# Patient Record
Sex: MALE | Race: WHITE | NOT HISPANIC OR LATINO | Employment: OTHER | URBAN - NONMETROPOLITAN AREA
[De-identification: names, ages, dates, MRNs, and addresses within clinical notes are randomized per-mention and may not be internally consistent; named-entity substitution may affect disease eponyms.]

---

## 2019-08-06 ENCOUNTER — OFFICE VISIT (OUTPATIENT)
Dept: CARDIOLOGY | Facility: CLINIC | Age: 54
End: 2019-08-06

## 2019-08-06 VITALS
HEIGHT: 77 IN | BODY MASS INDEX: 37.19 KG/M2 | HEART RATE: 63 BPM | WEIGHT: 315 LBS | SYSTOLIC BLOOD PRESSURE: 110 MMHG | OXYGEN SATURATION: 96 % | DIASTOLIC BLOOD PRESSURE: 68 MMHG

## 2019-08-06 DIAGNOSIS — I49.3 PVC (PREMATURE VENTRICULAR CONTRACTION): ICD-10-CM

## 2019-08-06 DIAGNOSIS — R06.09 DOE (DYSPNEA ON EXERTION): Primary | ICD-10-CM

## 2019-08-06 DIAGNOSIS — I10 ESSENTIAL HYPERTENSION: ICD-10-CM

## 2019-08-06 DIAGNOSIS — M25.551 PAIN OF RIGHT HIP JOINT: ICD-10-CM

## 2019-08-06 DIAGNOSIS — R00.2 PALPITATIONS: ICD-10-CM

## 2019-08-06 PROCEDURE — 99204 OFFICE O/P NEW MOD 45 MIN: CPT | Performed by: INTERNAL MEDICINE

## 2019-08-06 PROCEDURE — 93000 ELECTROCARDIOGRAM COMPLETE: CPT | Performed by: INTERNAL MEDICINE

## 2019-08-06 RX ORDER — HYDROCHLOROTHIAZIDE 25 MG/1
25 TABLET ORAL DAILY
COMMUNITY
End: 2021-11-23 | Stop reason: ALTCHOICE

## 2019-08-06 RX ORDER — TESTOSTERONE CYPIONATE 200 MG/ML
INJECTION, SOLUTION INTRAMUSCULAR
Refills: 2 | COMMUNITY
Start: 2019-07-15

## 2019-08-06 RX ORDER — TRANDOLAPRIL AND VERAPAMIL HYDROCHLORIDE 4; 240 MG/1; MG/1
1 TABLET, FILM COATED, EXTENDED RELEASE ORAL DAILY
Refills: 1 | COMMUNITY
Start: 2019-07-15 | End: 2021-07-12

## 2019-08-06 NOTE — PROGRESS NOTES
Vignesh Alvarez  [id number]  1965  54 y.o.  male    Referring Provider: Sergio Delarosa MD    Reason for  Visit:  Initial visit for  shortness of breath and palpitations  Chest pressure    Subjective    Moderate exertional shortness of breath on exertion relieved with rest  No significant cough or wheezing  Going on for 6  months    Occasional palpitations, once every several weeks lasting for less than 1 minute  No associated symptoms of dizziness, weakness, chest pain,  shortness of breath    Often at night     Associated chest pressure   No nausea   No radiation    Sweats easily   No significant pedal edema    No fever or chills  No significant expectoration    No hemoptysis  No presyncope or syncope     Has moderate snoring with daytime fatigue        History of present illness:  Vignesh Alvarez is a 54 y.o. yo male with history of Essential Hypertension   who presents today for   Chief Complaint   Patient presents with   • Hypertension     NEW PT   • DYSPNEA ON EXERTION   • Palpitations   .    History  Past Medical History:   Diagnosis Date   • Fragoso's esophagus    • Dyspnea on exertion    • Hypertension    ,   Past Surgical History:   Procedure Laterality Date   • REPLACEMENT TOTAL KNEE      RIGHT - 12/2018   ,   Family History   Problem Relation Age of Onset   • Hypertension Mother    • Heart failure Father    • Hypertension Father    ,   Social History     Tobacco Use   • Smoking status: Never Smoker   • Smokeless tobacco: Never Used   Substance Use Topics   • Alcohol use: Yes     Comment: OCC   • Drug use: No   ,     Medications  Current Outpatient Medications   Medication Sig Dispense Refill   • aspirin 81 MG tablet Take 81 mg by mouth Daily.     • hydrochlorothiazide (HYDRODIURIL) 25 MG tablet Take 25 mg by mouth Daily.     • linaclotide (LINZESS) 72 MCG capsule capsule Take 72 mcg by mouth Daily.     • metFORMIN (GLUCOPHAGE) 1000 MG tablet Take 1,000 mg by mouth 2 (Two) Times a Day.     •  "OMEPRAZOLE PO Take 20 mg by mouth Daily.     • Testosterone Cypionate (DEPOTESTOTERONE CYPIONATE) 200 MG/ML injection INJECT 1.5MLS EVERY TWO WEEKS  2   • trandolapril-verapamil (TARKA) 4-240 MG per CR tablet Take 1 tablet by mouth Daily.  1     No current facility-administered medications for this visit.        Allergies:  Patient has no known allergies.    Review of Systems  Review of Systems   Constitution: Positive for weakness and malaise/fatigue.   HENT: Negative.    Eyes: Negative.    Cardiovascular: Positive for dyspnea on exertion and palpitations. Negative for chest pain, claudication, cyanosis, irregular heartbeat, leg swelling, near-syncope, orthopnea, paroxysmal nocturnal dyspnea and syncope.   Respiratory: Negative.    Endocrine: Negative.    Hematologic/Lymphatic: Negative.    Skin: Negative.    Musculoskeletal: Positive for arthritis.   Gastrointestinal: Negative for anorexia.   Genitourinary: Negative.    Psychiatric/Behavioral: Negative.        Objective     Physical Exam:  /68   Pulse 63   Ht 195.6 cm (77\")   Wt (!) 151 kg (333 lb)   SpO2 96%   BMI 39.49 kg/m²     Physical Exam   Constitutional: He appears well-developed.   HENT:   Head: Normocephalic.   Neck: Normal carotid pulses and no JVD present. No tracheal tenderness present. Carotid bruit is not present. No tracheal deviation and no edema present.   Cardiovascular: Regular rhythm, normal heart sounds and normal pulses.   Pulmonary/Chest: Effort normal. No stridor.   Abdominal: Soft.   Neurological: He is alert. He has normal strength. No cranial nerve deficit or sensory deficit.   Skin: Skin is warm.   Psychiatric: He has a normal mood and affect. His speech is normal and behavior is normal.       Results Review:       ECG 12 Lead  Date/Time: 8/6/2019 10:34 AM  Performed by: Abdirahman Quinonez MD  Authorized by: Abdirahman Quinonez MD   Comparison: not compared with previous ECG   Rhythm: sinus rhythm  Ectopy: unifocal PVCs  Rate: " normal  Conduction: conduction normal  T inversion: II, III and aVF  QRS axis: normal    Clinical impression: abnormal EKG            Assessment/Plan   Vignesh was seen today for hypertension, dyspnea on exertion and palpitations.    Diagnoses and all orders for this visit:    MOON (dyspnea on exertion)  -     ECG 12 Lead  -     Adult Stress Echo W/ Cont or Stress Agent if Necessary Per Protocol; Future    Palpitations  -     ECG 12 Lead  -     Holter Monitor - 24 Hour; Future  -     Adult Transthoracic Echo Complete W/ Cont if Necessary Per Protocol; Future    Essential hypertension    Pain of right hip joint    PVC (premature ventricular contraction)           Plan         Orders Placed This Encounter   Procedures   • Holter Monitor - 24 Hour     Standing Status:   Future     Standing Expiration Date:   8/5/2020     Order Specific Question:   Reason for exam?     Answer:   Palpitations   • ECG 12 Lead     This order was created via procedure documentation   • Adult Transthoracic Echo Complete W/ Cont if Necessary Per Protocol     Standing Status:   Future     Standing Expiration Date:   8/5/2020     Order Specific Question:   Reason for exam?     Answer:   Palpitations   • Adult Stress Echo W/ Cont or Stress Agent if Necessary Per Protocol     Standing Status:   Future     Standing Expiration Date:   8/5/2020     Order Specific Question:   What stress agent will be used?     Answer:   Dobutamine     Order Specific Question:   Reason for Dobutamine?     Answer:   Unable to Exercise     Order Specific Question:   Reason for exam?     Answer:   Dyspnea        Recommend evaluation for obstructive sleep apnea given snoring and daytime somnolence and fatigue by primary provider  In addition has body habitus including oopharyngeal crowding increasing the likelihood of obstructive sleep apnea     ____________________________________________________________________________________________________________________________________________  Health maintenance and recommendations      Low salt/ HTN/ Heart healthy carbohydrate restricted cardiac diet   The patient is advised to reduce or avoid caffeine or other cardiac stimulants.     Minimize or avoid  NSAID-type medications        Monitor for any signs of bleeding including red or dark stools. Fall precautions.        Advised staying uptodate with immunizations per established standard guidelines.      Offered to give patient  a copy of my notes       Questions were encouraged, asked and answered to the patient's  understanding and satisfaction. Questions if any regarding current medications and side effects, need for refills and importance of compliance to medications stressed.    Reviewed available prior notes, consults, prior visits, laboratory findings, radiology and cardiology relevant reports. Updated chart as applicable. I have reviewed the patient's medical history in detail and updated the computerized patient record as relevant.      Updated patient regarding any new or relevant abnormalities on review of records or any new findings on physical exam. Mentioned to patient about purpose of visit and desirable health short and long term goals and objectives.    Primary to monitor CBC CMP Lipid panel and TSH as applicable    ___________________________________________________________________________________________________________________________________________          Return in about 6 weeks (around 9/17/2019).

## 2019-08-19 ENCOUNTER — HOSPITAL ENCOUNTER (OUTPATIENT)
Dept: CARDIOLOGY | Facility: HOSPITAL | Age: 54
Discharge: HOME | End: 2019-08-19

## 2019-08-19 ENCOUNTER — HOSPITAL ENCOUNTER (OUTPATIENT)
Dept: CARDIOLOGY | Facility: HOSPITAL | Age: 54
Discharge: HOME | End: 2019-08-19
Admitting: INTERNAL MEDICINE

## 2019-08-19 VITALS
BODY MASS INDEX: 37.19 KG/M2 | HEIGHT: 77 IN | WEIGHT: 315 LBS | HEART RATE: 90 BPM | SYSTOLIC BLOOD PRESSURE: 150 MMHG | DIASTOLIC BLOOD PRESSURE: 88 MMHG

## 2019-08-19 DIAGNOSIS — R06.09 DOE (DYSPNEA ON EXERTION): ICD-10-CM

## 2019-08-19 DIAGNOSIS — R00.2 PALPITATIONS: ICD-10-CM

## 2019-08-19 PROCEDURE — 93352 ADMIN ECG CONTRAST AGENT: CPT | Performed by: INTERNAL MEDICINE

## 2019-08-19 PROCEDURE — 93350 STRESS TTE ONLY: CPT

## 2019-08-19 PROCEDURE — 93306 TTE W/DOPPLER COMPLETE: CPT | Performed by: INTERNAL MEDICINE

## 2019-08-19 PROCEDURE — 93350 STRESS TTE ONLY: CPT | Performed by: INTERNAL MEDICINE

## 2019-08-19 PROCEDURE — 93306 TTE W/DOPPLER COMPLETE: CPT

## 2019-08-19 PROCEDURE — 25010000002 PERFLUTREN 6.52 MG/ML SUSPENSION: Performed by: INTERNAL MEDICINE

## 2019-08-19 PROCEDURE — 93018 CV STRESS TEST I&R ONLY: CPT | Performed by: INTERNAL MEDICINE

## 2019-08-19 PROCEDURE — 25010000003 DOBUTAMINE PER 250 MG: Performed by: INTERNAL MEDICINE

## 2019-08-19 PROCEDURE — 93017 CV STRESS TEST TRACING ONLY: CPT

## 2019-08-19 RX ORDER — DOBUTAMINE HYDROCHLORIDE 100 MG/100ML
10 INJECTION INTRAVENOUS
Status: DISCONTINUED | OUTPATIENT
Start: 2019-08-19 | End: 2019-08-20 | Stop reason: HOSPADM

## 2019-08-19 RX ADMIN — PERFLUTREN 8.48 MG: 6.52 INJECTION, SUSPENSION INTRAVENOUS at 08:59

## 2019-08-19 RX ADMIN — Medication 10 MCG/KG/MIN: at 08:59

## 2019-08-21 LAB
BH CV ECHO MEAS - AO MAX PG (FULL): 2.8 MMHG
BH CV ECHO MEAS - AO MAX PG: 7.3 MMHG
BH CV ECHO MEAS - AO MEAN PG (FULL): 2 MMHG
BH CV ECHO MEAS - AO MEAN PG: 5 MMHG
BH CV ECHO MEAS - AO ROOT AREA (BSA CORRECTED): 3.6
BH CV ECHO MEAS - AO ROOT AREA: 10.8 CM^2
BH CV ECHO MEAS - AO ROOT DIAM: 3.7 CM
BH CV ECHO MEAS - AO V2 MAX: 135 CM/SEC
BH CV ECHO MEAS - AO V2 MEAN: 112 CM/SEC
BH CV ECHO MEAS - AO V2 VTI: 31.2 CM
BH CV ECHO MEAS - AVA(I,A): 3 CM^2
BH CV ECHO MEAS - AVA(I,D): 3 CM^2
BH CV ECHO MEAS - AVA(V,A): 3.3 CM^2
BH CV ECHO MEAS - AVA(V,D): 3.3 CM^2
BH CV ECHO MEAS - BSA(HAYCOCK): 0.83 M^2
BH CV ECHO MEAS - BSA: 1 M^2
BH CV ECHO MEAS - BZI_BMI: 3.8 KILOGRAMS/M^2
BH CV ECHO MEAS - BZI_METRIC_HEIGHT: 195.6 CM
BH CV ECHO MEAS - BZI_METRIC_WEIGHT: 14.5 KG
BH CV ECHO MEAS - EDV(CUBED): 183.3 ML
BH CV ECHO MEAS - EDV(MOD-SP4): 165 ML
BH CV ECHO MEAS - EDV(TEICH): 158.8 ML
BH CV ECHO MEAS - EF(MOD-SP4): 53.9 %
BH CV ECHO MEAS - ESV(MOD-SP4): 76 ML
BH CV ECHO MEAS - IVS/LVPW: 1.1
BH CV ECHO MEAS - IVSD: 1.6 CM
BH CV ECHO MEAS - LA DIMENSION: 4.5 CM
BH CV ECHO MEAS - LA/AO: 1.2
BH CV ECHO MEAS - LAT PEAK E' VEL: 8.3 CM/SEC
BH CV ECHO MEAS - LV DIASTOLIC VOL/BSA (35-75): 160.7 ML/M^2
BH CV ECHO MEAS - LV MASS(C)D: 407.4 GRAMS
BH CV ECHO MEAS - LV MASS(C)DI: 396.9 GRAMS/M^2
BH CV ECHO MEAS - LV MAX PG: 4.5 MMHG
BH CV ECHO MEAS - LV MEAN PG: 3 MMHG
BH CV ECHO MEAS - LV SYSTOLIC VOL/BSA (12-30): 74 ML/M^2
BH CV ECHO MEAS - LV V1 MAX: 106 CM/SEC
BH CV ECHO MEAS - LV V1 MEAN: 82.1 CM/SEC
BH CV ECHO MEAS - LV V1 VTI: 22.5 CM
BH CV ECHO MEAS - LVIDD: 5.7 CM
BH CV ECHO MEAS - LVLD AP4: 8.8 CM
BH CV ECHO MEAS - LVLS AP4: 7.1 CM
BH CV ECHO MEAS - LVOT AREA (M): 4.2 CM^2
BH CV ECHO MEAS - LVOT AREA: 4.2 CM^2
BH CV ECHO MEAS - LVOT DIAM: 2.3 CM
BH CV ECHO MEAS - LVPWD: 1.5 CM
BH CV ECHO MEAS - MED PEAK E' VEL: 8.7 CM/SEC
BH CV ECHO MEAS - MV A MAX VEL: 94.6 CM/SEC
BH CV ECHO MEAS - MV DEC SLOPE: 284 CM/SEC^2
BH CV ECHO MEAS - MV DEC TIME: 0.22 SEC
BH CV ECHO MEAS - MV E MAX VEL: 63.1 CM/SEC
BH CV ECHO MEAS - MV E/A: 0.67
BH CV ECHO MEAS - SI(AO): 326.8 ML/M^2
BH CV ECHO MEAS - SI(LVOT): 91.1 ML/M^2
BH CV ECHO MEAS - SI(MOD-SP4): 86.7 ML/M^2
BH CV ECHO MEAS - SV(AO): 335.5 ML
BH CV ECHO MEAS - SV(LVOT): 93.5 ML
BH CV ECHO MEAS - SV(MOD-SP4): 89 ML
BH CV ECHO MEASUREMENTS AVERAGE E/E' RATIO: 7.42
BH CV STRESS BP STAGE 1: NORMAL
BH CV STRESS BP STAGE 2: NORMAL
BH CV STRESS BP STAGE 3: NORMAL
BH CV STRESS BP STAGE 4: NORMAL
BH CV STRESS DOSE DOBUTAMINE STAGE 1: 10
BH CV STRESS DOSE DOBUTAMINE STAGE 2: 20
BH CV STRESS DOSE DOBUTAMINE STAGE 3: 30
BH CV STRESS DOSE DOBUTAMINE STAGE 4: 40
BH CV STRESS DURATION MIN STAGE 1: 3
BH CV STRESS DURATION MIN STAGE 2: 3
BH CV STRESS DURATION MIN STAGE 3: 3
BH CV STRESS DURATION MIN STAGE 4: 2
BH CV STRESS DURATION SEC STAGE 1: 0
BH CV STRESS DURATION SEC STAGE 2: 0
BH CV STRESS DURATION SEC STAGE 3: 0
BH CV STRESS DURATION SEC STAGE 4: 35
BH CV STRESS ECHO POST STRESS EJECTION FRACTION EF: 65 %
BH CV STRESS HR STAGE 1: 83
BH CV STRESS HR STAGE 2: 112
BH CV STRESS HR STAGE 3: 132
BH CV STRESS HR STAGE 4: 142
BH CV STRESS PROTOCOL 1: NORMAL
BH CV STRESS RECOVERY BP: NORMAL MMHG
BH CV STRESS RECOVERY HR: 100 BPM
BH CV STRESS STAGE 1: 1
BH CV STRESS STAGE 2: 2
BH CV STRESS STAGE 3: 3
BH CV STRESS STAGE 4: 4
LEFT ATRIUM VOLUME: 75.6 CM3
LV EF 2D ECHO EST: 55 %
LV EF 2D ECHO EST: 55 %
MAXIMAL PREDICTED HEART RATE: 164 BPM
MAXIMAL PREDICTED HEART RATE: 164 BPM
PERCENT MAX PREDICTED HR: 86.59 %
STRESS BASELINE BP: NORMAL MMHG
STRESS BASELINE HR: 90 BPM
STRESS PERCENT HR: 102 %
STRESS POST EXERCISE DUR MIN: 11 MIN
STRESS POST EXERCISE DUR SEC: 35 SEC
STRESS POST PEAK BP: NORMAL MMHG
STRESS POST PEAK HR: 142 BPM
STRESS TARGET HR: 139 BPM
STRESS TARGET HR: 139 BPM

## 2019-09-17 ENCOUNTER — OFFICE VISIT (OUTPATIENT)
Dept: CARDIOLOGY | Facility: CLINIC | Age: 54
End: 2019-09-17

## 2019-09-17 VITALS
OXYGEN SATURATION: 98 % | HEIGHT: 77 IN | WEIGHT: 315 LBS | HEART RATE: 111 BPM | BODY MASS INDEX: 37.19 KG/M2 | SYSTOLIC BLOOD PRESSURE: 152 MMHG | DIASTOLIC BLOOD PRESSURE: 98 MMHG

## 2019-09-17 DIAGNOSIS — M25.551 PAIN OF RIGHT HIP JOINT: ICD-10-CM

## 2019-09-17 DIAGNOSIS — E66.01 SEVERE OBESITY (BMI 35.0-39.9) WITH COMORBIDITY (HCC): ICD-10-CM

## 2019-09-17 DIAGNOSIS — R06.09 DOE (DYSPNEA ON EXERTION): ICD-10-CM

## 2019-09-17 DIAGNOSIS — I47.29 NSVT (NONSUSTAINED VENTRICULAR TACHYCARDIA) (HCC): ICD-10-CM

## 2019-09-17 DIAGNOSIS — R00.2 PALPITATIONS: Primary | ICD-10-CM

## 2019-09-17 DIAGNOSIS — I49.3 PVC (PREMATURE VENTRICULAR CONTRACTION): ICD-10-CM

## 2019-09-17 DIAGNOSIS — I10 ESSENTIAL HYPERTENSION: ICD-10-CM

## 2019-09-17 PROCEDURE — 99214 OFFICE O/P EST MOD 30 MIN: CPT | Performed by: INTERNAL MEDICINE

## 2019-09-17 NOTE — PROGRESS NOTES
Vingesh Alvarez  [id number]  1965  54 y.o.  male    Referring Provider: Sergio Delarosa MD    Reason for  Visit:  Here for follow up after cardiac testing after initial   visit for  shortness of breath and palpitations  Chest pressure    Subjective      Similar symptoms as during last visit     Overall the patient feels no major change from baseline symptoms     No new events or complaints since last visit     Moderate exertional shortness of breath on exertion relieved with rest  No significant cough or wheezing  Going on for 6  months    Occasional palpitations, once every several weeks lasting for less than 1 minute  No associated symptoms of dizziness, weakness, chest pain,  shortness of breath    Often at night     Associated chest pressure   No nausea   No radiation    Sweats easily   No significant pedal edema    No fever or chills  No significant expectoration    No hemoptysis  No presyncope or syncope     Has moderate snoring with daytime fatigue        History of present illness:  Vignesh Alvarez is a 54 y.o. yo male with history of Essential Hypertension   who presents today for   Chief Complaint   Patient presents with   • Hypertension     6 WK FU    • Migraine     YESTERDAY    • Palpitations   • Shortness of Breath   .    History  Past Medical History:   Diagnosis Date   • Fragoso's esophagus    • Dyspnea on exertion    • Hypertension    ,   Past Surgical History:   Procedure Laterality Date   • REPLACEMENT TOTAL KNEE      RIGHT - 12/2018   ,   Family History   Problem Relation Age of Onset   • Hypertension Mother    • Heart failure Father    • Hypertension Father    ,   Social History     Tobacco Use   • Smoking status: Never Smoker   • Smokeless tobacco: Never Used   Substance Use Topics   • Alcohol use: Yes     Comment: OCC   • Drug use: No   ,     Medications  Current Outpatient Medications   Medication Sig Dispense Refill   • aspirin 81 MG tablet Take 81 mg by mouth Daily.     •  "hydrochlorothiazide (HYDRODIURIL) 25 MG tablet Take 25 mg by mouth Daily.     • linaclotide (LINZESS) 72 MCG capsule capsule Take 72 mcg by mouth Daily.     • metFORMIN (GLUCOPHAGE) 1000 MG tablet Take 1,000 mg by mouth 2 (Two) Times a Day.     • OMEPRAZOLE PO Take 20 mg by mouth Daily.     • Testosterone Cypionate (DEPOTESTOTERONE CYPIONATE) 200 MG/ML injection INJECT 1.5MLS EVERY TWO WEEKS  2   • trandolapril-verapamil (TARKA) 4-240 MG per CR tablet Take 1 tablet by mouth Daily.  1   • metoprolol tartrate (LOPRESSOR) 25 MG tablet Take 1 tablet by mouth 2 (Two) Times a Day. 180 tablet 3     No current facility-administered medications for this visit.        Allergies:  Patient has no known allergies.    Review of Systems  Review of Systems   Constitution: Positive for weakness and malaise/fatigue.   HENT: Negative.    Eyes: Negative.    Cardiovascular: Positive for dyspnea on exertion and palpitations. Negative for chest pain, claudication, cyanosis, irregular heartbeat, leg swelling, near-syncope, orthopnea, paroxysmal nocturnal dyspnea and syncope.   Respiratory: Negative.    Endocrine: Negative.    Hematologic/Lymphatic: Negative.    Skin: Negative.    Musculoskeletal: Positive for arthritis.   Gastrointestinal: Negative for anorexia.   Genitourinary: Negative.    Psychiatric/Behavioral: Negative.        Objective     Physical Exam:  /98   Pulse 111   Ht 195.6 cm (77.01\")   Wt (!) 151 kg (332 lb)   SpO2 98%   BMI 39.36 kg/m²     Physical Exam   Constitutional: He appears well-developed.   HENT:   Head: Normocephalic.   Neck: Normal carotid pulses and no JVD present. No tracheal tenderness present. Carotid bruit is not present. No tracheal deviation and no edema present.   Cardiovascular: Regular rhythm, normal heart sounds and normal pulses.   Pulmonary/Chest: Effort normal. No stridor.   Abdominal: Soft. He exhibits no distension. There is no hepatosplenomegaly. There is no tenderness. "   Neurological: He is alert. He has normal strength. No cranial nerve deficit or sensory deficit.   Skin: Skin is warm.   Psychiatric: He has a normal mood and affect. His speech is normal and behavior is normal.       Results Review:      Results for orders placed during the hospital encounter of 19   Adult Stress Echo W/ Cont or Stress Agent if Necessary Per Protocol    Narrative · Estimated EF = 55%.  · Left ventricular systolic function is normal.  · Low risk stress test for stress induced myocardial ischemia       Interpretation Summary     · Estimated EF = 55%.  · Left ventricular wall thickness is consistent with moderate concentric hypertrophy.  · Left ventricular diastolic dysfunction.  · Left atrial cavity size is mildly dilated.  · No evidence of pulmonary hypertension is present.        [place] physician: Abdirahman Quinonez MD Ordering physician: Abdirahman Quinonez MD Study date: 19   Patient Information     Patient Name  Vignesh Alvarez MRN  [id number] Sex  Male  (Age)  1965 (54 y.o.)   Interpretation Summary        · Average HR: 90. Min HR: 60. Max HR: 139.     · Monitored for ~1 day   · The predominant rhythm noted during the testing period was sinus rhythm.  · [address]  premature ventricular contractions: PVC burden:  14.5 %   · 12 atrial premature contractions:  APC burden: <0.1%             · No significant supraventricular  tachy or kassandra arrhythmia.  · 9 runs of nonsustained ventricular tachycardia longest 4 beats at a maximum rate of 177 bpm.  · No patient diary available  · No significant pauses above 3 seconds     Conclusion: Baseline rhythm is sinus.  Frequent premature ventricular contractions with a total burden of 14.5%.  9 brief runs of nonsustained ventricular tachycardia.          Procedures    Assessment/Plan   Vignesh was seen today for hypertension, migraine, palpitations and shortness of breath.    Diagnoses and all orders for this visit:    Palpitations    Essential  hypertension    PVC (premature ventricular contraction)  -     Holter Monitor - 24 Hour; Future    MOON (dyspnea on exertion)    Pain of right hip joint    NSVT (nonsustained ventricular tachycardia) (CMS/HCC)    Severe obesity (BMI 35.0-39.9) with comorbidity (CMS/HCC)  -     Ambulatory Referral to Bariatric Surgery    Other orders  -     metoprolol tartrate (LOPRESSOR) 25 MG tablet; Take 1 tablet by mouth 2 (Two) Times a Day.           Plan       Requested Prescriptions     Signed Prescriptions Disp Refills   • metoprolol tartrate (LOPRESSOR) 25 MG tablet 180 tablet 3     Sig: Take 1 tablet by mouth 2 (Two) Times a Day.       Check BP and heart rates twice daily at least 3x / week at home and bring a recording for me to review next visit  IF BP >135/85 call sooner       Recommend evaluation for obstructive sleep apnea given snoring and daytime somnolence and fatigue by primary provider  In addition has body habitus including oopharyngeal crowding increasing the likelihood of obstructive sleep apnea    .  Orders Placed This Encounter   Procedures   • Ambulatory Referral to Bariatric Surgery     Referral Priority:   Routine     Referral Type:   Consultation     Referral Reason:   Specialty Services Required     Requested Specialty:   Bariatrics     Number of Visits Requested:   1   • Holter Monitor - 24 Hour     Standing Status:   Future     Standing Expiration Date:   9/16/2020     Order Specific Question:   Reason for exam?     Answer:   Palpitations        ____________________________________________________________________________________________________________________________________________  Health maintenance and recommendations      Low salt/ HTN/ Heart healthy carbohydrate restricted cardiac diet   The patient is advised to reduce or avoid caffeine or other cardiac stimulants.     Minimize or avoid  NSAID-type medications        Monitor for any signs of bleeding including red or dark stools. Fall  precautions.        Advised staying uptodate with immunizations per established standard guidelines.      Offered to give patient  a copy of my notes       Questions were encouraged, asked and answered to the patient's  understanding and satisfaction. Questions if any regarding current medications and side effects, need for refills and importance of compliance to medications stressed.    Reviewed available prior notes, consults, prior visits, laboratory findings, radiology and cardiology relevant reports. Updated chart as applicable. I have reviewed the patient's medical history in detail and updated the computerized patient record as relevant.      Updated patient regarding any new or relevant abnormalities on review of records or any new findings on physical exam. Mentioned to patient about purpose of visit and desirable health short and long term goals and objectives.    Primary to monitor CBC CMP Lipid panel and TSH as applicable    ___________________________________________________________________________________________________________________________________________          Return in about 6 weeks (around 10/29/2019).

## 2019-09-19 ENCOUNTER — TELEPHONE (OUTPATIENT)
Dept: BARIATRICS/WEIGHT MGMT | Facility: CLINIC | Age: 54
End: 2019-09-19

## 2019-09-25 ENCOUNTER — OFFICE VISIT (OUTPATIENT)
Dept: BARIATRICS/WEIGHT MGMT | Facility: CLINIC | Age: 54
End: 2019-09-25

## 2019-09-25 VITALS
TEMPERATURE: 97.8 F | SYSTOLIC BLOOD PRESSURE: 144 MMHG | DIASTOLIC BLOOD PRESSURE: 90 MMHG | WEIGHT: 315 LBS | OXYGEN SATURATION: 93 % | HEART RATE: 88 BPM | BODY MASS INDEX: 38.36 KG/M2 | HEIGHT: 76 IN

## 2019-09-25 DIAGNOSIS — K21.00 GASTROESOPHAGEAL REFLUX DISEASE WITH ESOPHAGITIS: ICD-10-CM

## 2019-09-25 DIAGNOSIS — I10 ESSENTIAL HYPERTENSION: ICD-10-CM

## 2019-09-25 DIAGNOSIS — K22.70 BARRETT'S ESOPHAGUS WITHOUT DYSPLASIA: ICD-10-CM

## 2019-09-25 DIAGNOSIS — E66.01 CLASS 3 SEVERE OBESITY DUE TO EXCESS CALORIES WITH SERIOUS COMORBIDITY AND BODY MASS INDEX (BMI) OF 40.0 TO 44.9 IN ADULT (HCC): Primary | ICD-10-CM

## 2019-09-25 PROBLEM — K21.9 GERD (GASTROESOPHAGEAL REFLUX DISEASE): Status: ACTIVE | Noted: 2019-09-25

## 2019-09-25 PROCEDURE — 99204 OFFICE O/P NEW MOD 45 MIN: CPT | Performed by: SURGERY

## 2019-09-25 NOTE — PROGRESS NOTES
Patient Care Team:  Sergio Delarosa MD as PCP - General (Family Medicine)  Sergio Delarosa MD as Referring Physician (Family Medicine)  Abdirahman Quinonez MD as Cardiologist (Cardiology)    Reason for Visit:  Weight loss    Subjective     Patient is a 54 y.o. male presents with morbid obesity and his Body mass index is 40.05 kg/m².     He is here for discussion of surgical weight loss options.  He stated he has been with the disease of obesity for year(s).  He stated he suffers from hypertension, GERD and morbid obesity due to his weight gain.  He stated that weight loss helps alleviate these symptoms.   He stated that he has tried a medically supervised weight loss program in the past to help with weight loss.  He stated that he has attempted these conservative methods for weight loss without maintaining long term success.  Today he would like to discuss surgical weight loss options such as the Laparoscopic Sleeve Gastrectomy or the Laparoscopic R - Y Gastric Bypass.     Review of Systems  General ROS: negative  Psychological ROS: negative  Ophthalmic ROS: positive for - uses glasses  ENT ROS: negative  Endocrine ROS: negative  Respiratory ROS: no cough, shortness of breath, or wheezing  Cardiovascular ROS: positive for - dyspnea on exertion, irregular heartbeat and palpitations  Gastrointestinal ROS: no abdominal pain, change in bowel habits, or black or bloody stools  Musculoskeletal ROS: positive for - joint pain  Neurological ROS: no TIA or stroke symptoms    History  Past Medical History:   Diagnosis Date   • Anemia    • Fragoso's esophagus      Dr Gertrude [place]    • Dyspnea on exertion    • GERD (gastroesophageal reflux disease)    • Hypertension    • Joint pain      Past Surgical History:   Procedure Laterality Date   • REPLACEMENT TOTAL KNEE      RIGHT - 12/2018   • TONSILLECTOMY  1969     Family History   Problem Relation Age of Onset   • Hypertension Mother    • Diabetes Mother    • Heart  failure Father    • Hypertension Father    • Arthritis Father    • Heart disease Father      Social History     Tobacco Use   • Smoking status: Never Smoker   • Smokeless tobacco: Never Used   Substance Use Topics   • Alcohol use: Yes     Comment: OCC   • Drug use: No       (Not in a hospital admission)  Allergies:  Patient has no known allergies.      Current Outpatient Medications:   •  aspirin 81 MG tablet, Take 81 mg by mouth Daily., Disp: , Rfl:   •  hydrochlorothiazide (HYDRODIURIL) 25 MG tablet, Take 25 mg by mouth Daily., Disp: , Rfl:   •  linaclotide (LINZESS) 72 MCG capsule capsule, Take 72 mcg by mouth Daily., Disp: , Rfl:   •  metFORMIN (GLUCOPHAGE) 1000 MG tablet, Take 1,000 mg by mouth 2 (Two) Times a Day., Disp: , Rfl:   •  metoprolol tartrate (LOPRESSOR) 25 MG tablet, Take 1 tablet by mouth 2 (Two) Times a Day., Disp: 180 tablet, Rfl: 3  •  OMEPRAZOLE PO, Take 20 mg by mouth Daily., Disp: , Rfl:   •  Testosterone Cypionate (DEPOTESTOTERONE CYPIONATE) 200 MG/ML injection, INJECT 1.5MLS EVERY TWO WEEKS, Disp: , Rfl: 2  •  trandolapril-verapamil (TARKA) 4-240 MG per CR tablet, Take 1 tablet by mouth Daily., Disp: , Rfl: 1    Objective     Vital Signs  Temp:  [97.8 °F (36.6 °C)] 97.8 °F (36.6 °C)  Heart Rate:  [88] 88  BP: (144)/(90) 144/90  Body mass index is 40.05 kg/m².      09/25/19  1329   Weight: (!) 149 kg (329 lb)       Physical Exam:      HEENT: extra ocular movement intact and Thyroid without masses  Respiratory: appears well, vitals normal, no respiratory distress, acyanotic, normal RR, neck free of mass or lymphadenopathy, chest clear, no wheezing, crepitations, rhonchi, normal symmetric air entry  Cardiovascular: Regular rate and rhythm, S1, S2 normal, no murmur, click, rub or gallop  GI: Soft, non-tender, normal bowel sounds; no bruits, organomegaly or masses.  Abnormal shape: obese  Musculoskeletal: inspection - no abnormality  Neurologic: alert, oriented, normal speech, no focal findings  "or movement disorder noted       Results Review:   None        Assessment/Plan   Encounter Diagnoses   Name Primary?   • Class 3 severe obesity due to excess calories with serious comorbidity and body mass index (BMI) of 40.0 to 44.9 in adult (CMS/[facility]) Yes   • Essential hypertension    • Gastroesophageal reflux disease with esophagitis    • Fragoso's esophagus without dysplasia        He has been provided a structured dietary regimen based off of his behavior.  I discussed with the patient the etiology of the disease of obesity and the potential comorbid conditions associated with this disease.  He was instructed to follow the dietary regimen and follow-up with our program in 1 month's time with any additional questions as they may arise during this time.  We emphasized on focusing on proteins and meals high in fiber as well as adequate hydration that exceed 64 ounces of water daily.    I explained that I anticipate the patient to lose 6 to 8 pounds prior to his next monthly visit.  I have also explained that they need to record or document when they are going to have the \"cheat day\".  I believe this patient will be a good candidate for weight loss surgery.  However at our facility we do not recommend a sleeve gastrectomy with a history of Fragoso's esophagus.    Laparoscopic Shala and Y Gastric Bypass would be a more reasonable weight loss surgical procedure for this patient because he suffers from morbid obesity, chronic reflux and the potential long term affects of Fragoso's esophagus.       Vignesh Alvarez understands that surgery is a tool and that weight loss is not guaranteed but only seen in the context of appropriate use, regular follow up, exercise and making appropriate food choices.  The patient reports that his hypertension and reflux have remained stable on his current treatment regimen.  I anticipate improvement of these comorbid conditions with reversal of his morbid obesity.      Upon completion of " our discussion and addressing and answering his questions to his satisfation, informed consent was obtained after the consent form was reviewed with me to provide any answers to additional questions he may have.   He will be scheduled accordingly.    I discussed the patient's findings and my recommendations with patient.      I have also recommended that he obtain completion of a medically supervised weight loss program prior to surgery.    Dr. Fer Gunderson MD [facility]    09/25/19  2:17 PM  Patient Care Team:  Sergio Delarosa MD as PCP - General (Family Medicine)  Sergio Delarosa MD as Referring Physician (Family Medicine)  Abdirahman Quinonez MD as Cardiologist (Cardiology)

## 2019-10-14 ENCOUNTER — TELEPHONE (OUTPATIENT)
Dept: BARIATRICS/WEIGHT MGMT | Facility: CLINIC | Age: 54
End: 2019-10-14

## 2019-10-29 ENCOUNTER — OFFICE VISIT (OUTPATIENT)
Dept: CARDIOLOGY | Facility: CLINIC | Age: 54
End: 2019-10-29

## 2019-10-29 ENCOUNTER — OFFICE VISIT (OUTPATIENT)
Dept: BARIATRICS/WEIGHT MGMT | Facility: CLINIC | Age: 54
End: 2019-10-29

## 2019-10-29 VITALS
HEART RATE: 69 BPM | HEIGHT: 76 IN | WEIGHT: 315 LBS | TEMPERATURE: 99.1 F | DIASTOLIC BLOOD PRESSURE: 93 MMHG | BODY MASS INDEX: 38.36 KG/M2 | SYSTOLIC BLOOD PRESSURE: 156 MMHG | OXYGEN SATURATION: 98 %

## 2019-10-29 VITALS
HEART RATE: 78 BPM | SYSTOLIC BLOOD PRESSURE: 130 MMHG | HEIGHT: 77 IN | OXYGEN SATURATION: 98 % | WEIGHT: 315 LBS | DIASTOLIC BLOOD PRESSURE: 88 MMHG | BODY MASS INDEX: 37.19 KG/M2

## 2019-10-29 DIAGNOSIS — R00.2 PALPITATIONS: ICD-10-CM

## 2019-10-29 DIAGNOSIS — I10 ESSENTIAL HYPERTENSION: ICD-10-CM

## 2019-10-29 DIAGNOSIS — K21.9 GASTROESOPHAGEAL REFLUX DISEASE, ESOPHAGITIS PRESENCE NOT SPECIFIED: ICD-10-CM

## 2019-10-29 DIAGNOSIS — G47.33 OBSTRUCTIVE SLEEP APNEA SYNDROME: ICD-10-CM

## 2019-10-29 DIAGNOSIS — R06.09 DOE (DYSPNEA ON EXERTION): ICD-10-CM

## 2019-10-29 DIAGNOSIS — E66.01 CLASS 2 SEVERE OBESITY DUE TO EXCESS CALORIES WITH SERIOUS COMORBIDITY AND BODY MASS INDEX (BMI) OF 39.0 TO 39.9 IN ADULT (HCC): Primary | ICD-10-CM

## 2019-10-29 DIAGNOSIS — K22.70 BARRETT'S ESOPHAGUS WITHOUT DYSPLASIA: ICD-10-CM

## 2019-10-29 DIAGNOSIS — I47.29 NSVT (NONSUSTAINED VENTRICULAR TACHYCARDIA) (HCC): ICD-10-CM

## 2019-10-29 DIAGNOSIS — M25.551 PAIN OF RIGHT HIP JOINT: ICD-10-CM

## 2019-10-29 DIAGNOSIS — I49.3 PVC (PREMATURE VENTRICULAR CONTRACTION): Primary | ICD-10-CM

## 2019-10-29 PROCEDURE — 99214 OFFICE O/P EST MOD 30 MIN: CPT | Performed by: INTERNAL MEDICINE

## 2019-10-29 PROCEDURE — 99214 OFFICE O/P EST MOD 30 MIN: CPT | Performed by: NURSE PRACTITIONER

## 2019-10-29 PROCEDURE — 93000 ELECTROCARDIOGRAM COMPLETE: CPT | Performed by: INTERNAL MEDICINE
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
## 2019-10-29 NOTE — PROGRESS NOTES
Vignesh Alvarez  [id number]  1965  54 y.o.  male    Referring Provider: Sergio Delarosa MD    Reason for  Visit:  Here for routine follow up   Prior visit  visit for  shortness of breath and palpitations  Chest pressure now resolved   Diagnosed with obstructive sleep apnea now CPAP    Subjective        Feels much bnetter overall    No new events or complaints since last visit   Overall the patient feels no major change from baseline symptoms   Less symptoms as during last visit      better after starting CPAP     Can walk for miles easily   No exertional chest pain     No new events or complaints since last visit     Less exertional shortness of breath on exertion relieved with rest  No significant cough or wheezing    Less palpitations   No significant pedal edema    No fever or chills  No significant expectoration    No hemoptysis  No presyncope or syncope     Has moderate snoring with daytime fatigue        History of present illness:  Vignesh Alvarez is a 54 y.o. yo male with history of Essential Hypertension   who presents today for   Chief Complaint   Patient presents with   • Palpitations     6 wk f/u - holter results   .    History  Past Medical History:   Diagnosis Date   • Anemia    • Fragoso's esophagus      Dr Gertrude Woods [place]    • Dyspnea on exertion    • GERD (gastroesophageal reflux disease)    • Hypertension    • Joint pain    • Sleep apnea     uses cpap   ,   Past Surgical History:   Procedure Laterality Date   • REPLACEMENT TOTAL KNEE      RIGHT - 12/2018   • TONSILLECTOMY  1969   ,   Family History   Problem Relation Age of Onset   • Hypertension Mother    • Diabetes Mother    • Heart failure Father    • Hypertension Father    • Arthritis Father    • Heart disease Father    ,   Social History     Tobacco Use   • Smoking status: Never Smoker   • Smokeless tobacco: Never Used   Substance Use Topics   • Alcohol use: Yes     Comment: OCC   • Drug use: No   ,     Medications  Current  "Outpatient Medications   Medication Sig Dispense Refill   • aspirin 81 MG tablet Take 81 mg by mouth Daily.     • hydrochlorothiazide (HYDRODIURIL) 25 MG tablet Take 25 mg by mouth Daily.     • linaclotide (LINZESS) 72 MCG capsule capsule Take 72 mcg by mouth Daily.     • metFORMIN (GLUCOPHAGE) 1000 MG tablet Take 1,000 mg by mouth 2 (Two) Times a Day.     • metoprolol tartrate (LOPRESSOR) 25 MG tablet Take 1 tablet by mouth 2 (Two) Times a Day. 180 tablet 3   • OMEPRAZOLE PO Take 20 mg by mouth Daily.     • Testosterone Cypionate (DEPOTESTOTERONE CYPIONATE) 200 MG/ML injection INJECT 1.5MLS EVERY TWO WEEKS  2   • trandolapril-verapamil (TARKA) 4-240 MG per CR tablet Take 1 tablet by mouth Daily.  1     No current facility-administered medications for this visit.        Allergies:  Patient has no known allergies.    Review of Systems  Review of Systems   Constitution: Positive for weakness and malaise/fatigue.   HENT: Negative.    Eyes: Negative.    Cardiovascular: Positive for dyspnea on exertion and palpitations. Negative for chest pain, claudication, cyanosis, irregular heartbeat, leg swelling, near-syncope, orthopnea, paroxysmal nocturnal dyspnea and syncope.   Respiratory: Negative.    Endocrine: Negative.    Hematologic/Lymphatic: Negative.    Skin: Negative.    Musculoskeletal: Positive for arthritis.   Gastrointestinal: Negative for anorexia.   Genitourinary: Negative.    Psychiatric/Behavioral: Negative.        Objective     Physical Exam:  /88   Pulse 78   Ht 195.6 cm (77\")   Wt (!) 147 kg (325 lb)   SpO2 98%   BMI 38.54 kg/m²     Physical Exam   Constitutional: He appears well-developed.   HENT:   Head: Normocephalic.   Neck: Normal carotid pulses and no JVD present. No tracheal tenderness present. Carotid bruit is not present. No tracheal deviation and no edema present.   Cardiovascular: Regular rhythm, normal heart sounds and normal pulses.   Pulmonary/Chest: Effort normal. No stridor. "   Abdominal: Soft. He exhibits no distension. There is no hepatosplenomegaly. There is no tenderness.   Neurological: He is alert. He has normal strength. No cranial nerve deficit or sensory deficit.   Skin: Skin is warm.   Psychiatric: He has a normal mood and affect. His speech is normal and behavior is normal.       Results Review:      Results for orders placed during the hospital encounter of 19   Adult Stress Echo W/ Cont or Stress Agent if Necessary Per Protocol    Narrative · Estimated EF = 55%.  · Left ventricular systolic function is normal.  · Low risk stress test for stress induced myocardial ischemia       Interpretation Summary     · Estimated EF = 55%.  · Left ventricular wall thickness is consistent with moderate concentric hypertrophy.  · Left ventricular diastolic dysfunction.  · Left atrial cavity size is mildly dilated.  · No evidence of pulmonary hypertension is present.        Reading physician: Abdirahman Quinonez MD Ordering physician: Abdirahman Quinonez MD Study date: 19   Patient Information     Patient Name  Vignesh Alvarez MRN  [id number] Sex  Male  (Age)  1965 (54 y.o.)   Interpretation Summary        · Average HR: 90. Min HR: 60. Max HR: 139.     · Monitored for ~1 day   · The predominant rhythm noted during the testing period was sinus rhythm.  · 45[address]  premature ventricular contractions: PVC burden:  14.5 %   · 12 atrial premature contractions:  APC burden: <0.1%             · No significant supraventricular  tachy or kassandra arrhythmia.  · 9 runs of nonsustained ventricular tachycardia longest 4 beats at a maximum rate of 177 bpm.  · No patient diary available  · No significant pauses above 3 seconds     Conclusion: Baseline rhythm is sinus.  Frequent premature ventricular contractions with a total burden of 14.5%.  9 brief runs of nonsustained ventricular tachycardia.            ECG 12 Lead  Date/Time: 10/29/2019 12:50 PM  Performed by: Abdirahman Quinonez MD  Authorized by: Devi  MD Abdirahman   Comparison: compared with previous ECG from 8/6/2019  Comparison to previous ECG: premature ventricular contractions not seen  Rhythm: sinus rhythm  Rate: normal  Conduction: conduction normal  ST Segments: ST segments normal  T Waves: T waves normal  QRS axis: normal  Other findings: poor R wave progression    Clinical impression: abnormal EKG            Assessment/Plan   Vignesh was seen today for palpitations.    Diagnoses and all orders for this visit:    PVC (premature ventricular contraction)  -     ECG 12 Lead    Palpitations    Pain of right hip joint    NSVT (nonsustained ventricular tachycardia) (CMS/HCC)  -     ECG 12 Lead  -     Holter Monitor - 24 Hour; Future    Essential hypertension    MOON (dyspnea on exertion)  -     Holter Monitor - 24 Hour; Future    Fragoso's esophagus without dysplasia    Gastroesophageal reflux disease, esophagitis presence not specified           Plan       Discussed results of latest cardiac testing with patient     Keep A1c less than 7 Primary to monitor  Keep LDL below 70 mg/dl. Monitor liver and renal functions.   Monitor CBC, CMP, TSH (as indicated) and Lipid Panel by primary      Continue beta blocker therapy       Holter shows minor ectopy with sinus rhythm. No worrisome atrial or ventricular tachy or kassandra arrhythmia. No significant pause.       ____________________________________________________________________________________________________________________________________________  Health maintenance and recommendations      Similar recommendations as last visit     Offered to give patient  a copy of my notes       Questions were encouraged, asked and answered to the patient's  understanding and satisfaction. Questions if any regarding current medications and side effects, need for refills and importance of compliance to medications stressed.    Reviewed available prior notes, consults, prior visits, laboratory findings, radiology and cardiology  relevant reports. Updated chart as applicable. I have reviewed the patient's medical history in detail and updated the computerized patient record as relevant.      Updated patient regarding any new or relevant abnormalities on review of records or any new findings on physical exam. Mentioned to patient about purpose of visit and desirable health short and long term goals and objectives.    Primary to monitor CBC CMP Lipid panel and TSH as applicable    ___________________________________________________________________________________________________________________________________________          Return in about 6 months (around 4/29/2020).

## 2019-11-11 ENCOUNTER — TELEPHONE (OUTPATIENT)
Dept: BARIATRICS/WEIGHT MGMT | Facility: CLINIC | Age: 54
End: 2019-11-11

## 2019-11-25 ENCOUNTER — OFFICE VISIT (OUTPATIENT)
Dept: BARIATRICS/WEIGHT MGMT | Facility: CLINIC | Age: 54
End: 2019-11-25

## 2019-11-25 VITALS
SYSTOLIC BLOOD PRESSURE: 144 MMHG | TEMPERATURE: 99.1 F | DIASTOLIC BLOOD PRESSURE: 89 MMHG | BODY MASS INDEX: 38.36 KG/M2 | HEART RATE: 78 BPM | WEIGHT: 315 LBS | OXYGEN SATURATION: 94 % | HEIGHT: 76 IN

## 2019-11-25 DIAGNOSIS — G47.33 OBSTRUCTIVE SLEEP APNEA SYNDROME: ICD-10-CM

## 2019-11-25 DIAGNOSIS — I10 ESSENTIAL HYPERTENSION: ICD-10-CM

## 2019-11-25 DIAGNOSIS — E66.01 CLASS 2 SEVERE OBESITY DUE TO EXCESS CALORIES WITH SERIOUS COMORBIDITY AND BODY MASS INDEX (BMI) OF 39.0 TO 39.9 IN ADULT (HCC): Primary | ICD-10-CM

## 2019-11-25 DIAGNOSIS — K21.9 GASTROESOPHAGEAL REFLUX DISEASE, ESOPHAGITIS PRESENCE NOT SPECIFIED: ICD-10-CM

## 2019-11-25 PROBLEM — E66.812 CLASS 2 SEVERE OBESITY DUE TO EXCESS CALORIES WITH SERIOUS COMORBIDITY AND BODY MASS INDEX (BMI) OF 39.0 TO 39.9 IN ADULT: Status: ACTIVE | Noted: 2019-11-25

## 2019-11-25 PROCEDURE — 99213 OFFICE O/P EST LOW 20 MIN: CPT | Performed by: NURSE PRACTITIONER

## 2019-11-25 NOTE — PROGRESS NOTES
Patient Care Team:  Sergio Delarosa MD as PCP - General (Family Medicine)  Sergio Delarosa MD as Referring Physician (Family Medicine)  Abdirahman Quinonez MD as Cardiologist (Cardiology)    Reason for Visit:  Medical Weight Loss    Subjective        Vignesh Alvarez is a 54 y.o. year old male who is here for follow-up and continued medical management of morbid obesity. He is accompanied by his wife today. His current Body mass index is 39.15 kg/m². He states he suffers from high blood pressure, reflux, sleep apnea, and morbid obesity due to weight gain. He is currently following the 4 meals/day diet prescription. Vignesh Alvarez previously agreed to incorporate the prescription as provided, while also increasing physical exercise. Patient states he has been successful at eating 3-4 meals per day, eating smaller portion sizes, limiting carbohydrates after lunch, but admits to struggling with cravings of sweets. He has been exercising by walking 2-3 times per week for 30 minutes. Vignesh Alvarez also states he has been drinking 24 ounces of water and getting 40-60 grams of protein intake per day. He has lost 5 lbs of weight since his last visit.    Review of Systems  Negative except the below listed  General ROS: positive for  - appetite changes  Musculoskeletal ROS: positive for - joint pain    History  Past Medical History:   Diagnosis Date   • Anemia    • Fragoso's esophagus      Dr Gertrude Woods [place]    • Dyspnea on exertion    • GERD (gastroesophageal reflux disease)    • Hypertension    • Joint pain    • Sleep apnea     uses cpap     Past Surgical History:   Procedure Laterality Date   • REPLACEMENT TOTAL KNEE      RIGHT - 12/2018   • TONSILLECTOMY  1969     Family History   Problem Relation Age of Onset   • Hypertension Mother    • Diabetes Mother    • Heart failure Father    • Hypertension Father    • Arthritis Father    • Heart disease Father      Social History     Tobacco Use   • Smoking status:  Never Smoker   • Smokeless tobacco: Never Used   Substance Use Topics   • Alcohol use: Yes     Comment: OCC   • Drug use: No       (Not in a hospital admission)  Allergies:  Patient has no known allergies.      Current Outpatient Medications:   •  aspirin 81 MG tablet, Take 81 mg by mouth Daily., Disp: , Rfl:   •  hydrochlorothiazide (HYDRODIURIL) 25 MG tablet, Take 25 mg by mouth Daily., Disp: , Rfl:   •  linaclotide (LINZESS) 72 MCG capsule capsule, Take 72 mcg by mouth Daily., Disp: , Rfl:   •  metFORMIN (GLUCOPHAGE) 1000 MG tablet, Take 1,000 mg by mouth 2 (Two) Times a Day., Disp: , Rfl:   •  metoprolol tartrate (LOPRESSOR) 25 MG tablet, Take 1 tablet by mouth 2 (Two) Times a Day., Disp: 180 tablet, Rfl: 3  •  OMEPRAZOLE PO, Take 20 mg by mouth Daily., Disp: , Rfl:   •  Testosterone Cypionate (DEPOTESTOTERONE CYPIONATE) 200 MG/ML injection, INJECT 1.5MLS EVERY TWO WEEKS, Disp: , Rfl: 2  •  trandolapril-verapamil (TARKA) 4-240 MG per CR tablet, Take 1 tablet by mouth Daily., Disp: , Rfl: 1    Objective     Vital Signs  Temp:  [99.1 °F (37.3 °C)] 99.1 °F (37.3 °C)  Heart Rate:  [78] 78  BP: (144)/(89) 144/89  Body mass index is 39.15 kg/m².      11/25/19  1017   Weight: (!) 146 kg (321 lb 9.6 oz)       Physical Exam:  HEENT: extra ocular movement intact  Respiratory:appears well, vitals normal, no respiratory distress, acyanotic, normal RR, chest clear, no wheezing, crepitations, rhonchi, normal symmetric air entry  Cardiovascular: Regular rate and rhythm, S1, S2 normal, no murmur, click, rub or gallop  GI: Soft, non-tender, normal bowel sounds; no bruits, organomegaly or masses. Abnormal shape: Obese  Musculoskeletal: inspection - no abnormality, range of motion normal  Neurologic: alert, oriented, normal speech, no focal findings or movement disorder noted       Results Review:   None        Assessment/Plan   Encounter Diagnoses   Name Primary?   • Class 2 severe obesity due to excess calories with serious  comorbidity and body mass index (BMI) of 39.0 to 39.9 in adult (CMS/[facility]) Yes   • Essential hypertension    • Obstructive sleep apnea syndrome    • Gastroesophageal reflux disease, esophagitis presence not specified          1. Vignesh Alvarez was seen today for follow-up, obesity, nutrition counseling and weight loss. He has lost 5 lbs of weight since his last visit, making his Body mass index is 39.15 kg/m².. Today we discussed consistency with healthy changes in lifestyle, diet, and exercise for long term success. Vignesh Alvarez had received handouts to him explaining the recommendation on portion sizes/appetite control/reading nutrition labels. Intensive behavioral therapy for obesity was done today as well.    Goals for this month are: increase protein and water intake, eliminate carbohydrates after lunch, use real dark chocolate or sugar free free options on the provided meal plan for sweet cravings, and continue to incorporate recommended behavior and dietary modifications implemented thus far. Patient encouraged to call with questions and/or struggles as they may arise prior to next scheduled appointment.    2. Current comorbid conditions of hypertension, MOUNIKA, and GERD associated with his morbid obesity are reported to be stable on his current treatment regimen and medications. We anticipate the comorbid conditions to improve as we address his morbid obesity.    Follow up in 1 month for a weight recheck.    SID Gavin    11/25/19  12:16 PM  Patient Care Team:  Sergio Delarosa MD as PCP - General (Family Medicine)  Sergio Delarosa MD as Referring Physician (Family Medicine)  Abdirahman Quinonez MD as Cardiologist (Cardiology)

## 2019-12-18 ENCOUNTER — OFFICE VISIT (OUTPATIENT)
Dept: BARIATRICS/WEIGHT MGMT | Facility: CLINIC | Age: 54
End: 2019-12-18

## 2019-12-18 VITALS
DIASTOLIC BLOOD PRESSURE: 82 MMHG | WEIGHT: 315 LBS | TEMPERATURE: 99.1 F | HEIGHT: 76 IN | HEART RATE: 78 BPM | BODY MASS INDEX: 38.36 KG/M2 | SYSTOLIC BLOOD PRESSURE: 124 MMHG | OXYGEN SATURATION: 97 %

## 2019-12-18 DIAGNOSIS — G47.33 OBSTRUCTIVE SLEEP APNEA SYNDROME: ICD-10-CM

## 2019-12-18 DIAGNOSIS — E66.01 CLASS 2 SEVERE OBESITY DUE TO EXCESS CALORIES WITH SERIOUS COMORBIDITY AND BODY MASS INDEX (BMI) OF 38.0 TO 38.9 IN ADULT (HCC): Primary | ICD-10-CM

## 2019-12-18 DIAGNOSIS — K21.9 GASTROESOPHAGEAL REFLUX DISEASE, ESOPHAGITIS PRESENCE NOT SPECIFIED: ICD-10-CM

## 2019-12-18 DIAGNOSIS — I10 ESSENTIAL HYPERTENSION: ICD-10-CM

## 2019-12-18 PROCEDURE — 99213 OFFICE O/P EST LOW 20 MIN: CPT | Performed by: NURSE PRACTITIONER

## 2019-12-18 NOTE — PROGRESS NOTES
Patient Care Team:  Sergio Delarosa MD as PCP - General (Family Medicine)  Sergio Delarosa MD as Referring Physician (Family Medicine)  Abdirahman Quinonez MD as Cardiologist (Cardiology)    Reason for Visit:  Medical Weight Loss    Subjective        Vignesh Alvarez is a 54 y.o. year old male who is here for follow-up and continued medical management of morbid obesity. His current Body mass index is 38.39 kg/m². He states he suffers from high blood pressure, sleep apnea, reflux, and morbid obesity due to weight gain. He is currently following the 4 meals/day diet prescription. Vignesh Alvarez previously agreed to incorporate the prescription as provided, while also increasing physical exercise. Patient states he has been successful at eating 3-4 meals per day, limiting carbohydrates after lunch, and increasing water and protein intake. Patient states he has been sick and has struggled to get back on track with eating as recommended. He admits to struggling with getting 4 meals/day consistently and eating vegetables with every meal. He has not been exercising. Vignesh Alvarez also states he has been drinking 36-48 ounces of water and getting 40 grams of protein intake per day. He has lost 6 lbs of weight since his last visit.    Review of Systems  Negative except the below listed  Musculoskeletal ROS: positive for - pain in back and knee    History  Past Medical History:   Diagnosis Date   • Anemia    • Fragoso's esophagus      Dr Gertrude Woods [place]    • Dyspnea on exertion    • GERD (gastroesophageal reflux disease)    • Hypertension    • Joint pain    • Sleep apnea     uses cpap     Past Surgical History:   Procedure Laterality Date   • REPLACEMENT TOTAL KNEE      RIGHT - 12/2018   • TONSILLECTOMY  1969     Family History   Problem Relation Age of Onset   • Hypertension Mother    • Diabetes Mother    • Heart failure Father    • Hypertension Father    • Arthritis Father    • Heart disease Father      Social  History     Tobacco Use   • Smoking status: Never Smoker   • Smokeless tobacco: Never Used   Substance Use Topics   • Alcohol use: Yes     Comment: OCC   • Drug use: No       (Not in a hospital admission)  Allergies:  Patient has no known allergies.      Current Outpatient Medications:   •  aspirin 81 MG tablet, Take 81 mg by mouth Daily., Disp: , Rfl:   •  hydrochlorothiazide (HYDRODIURIL) 25 MG tablet, Take 25 mg by mouth Daily., Disp: , Rfl:   •  linaclotide (LINZESS) 72 MCG capsule capsule, Take 72 mcg by mouth Daily., Disp: , Rfl:   •  metFORMIN (GLUCOPHAGE) 1000 MG tablet, Take 1,000 mg by mouth 2 (Two) Times a Day., Disp: , Rfl:   •  metoprolol tartrate (LOPRESSOR) 25 MG tablet, Take 1 tablet by mouth 2 (Two) Times a Day., Disp: 180 tablet, Rfl: 3  •  OMEPRAZOLE PO, Take 20 mg by mouth Daily., Disp: , Rfl:   •  Testosterone Cypionate (DEPOTESTOTERONE CYPIONATE) 200 MG/ML injection, INJECT 1.5MLS EVERY TWO WEEKS, Disp: , Rfl: 2  •  trandolapril-verapamil (TARKA) 4-240 MG per CR tablet, Take 1 tablet by mouth Daily., Disp: , Rfl: 1    Objective     Vital Signs  Temp:  [99.1 °F (37.3 °C)] 99.1 °F (37.3 °C)  Heart Rate:  [78] 78  BP: (124)/(82) 124/82  Body mass index is 38.39 kg/m².      12/18/19  1402   Weight: (!) 143 kg (315 lb 6.4 oz)       Physical Exam:  HEENT: extra ocular movement intact  Respiratory:appears well, vitals normal, no respiratory distress, acyanotic, normal RR, chest clear, no wheezing, crepitations, rhonchi, normal symmetric air entry  Cardiovascular: Regular rate and rhythm, S1, S2 normal, no murmur, click, rub or gallop  GI: Soft, non-tender, normal bowel sounds; no bruits, organomegaly or masses. Abnormal shape: Obese  Musculoskeletal: inspection - no abnormality, range of motion normal  Neurologic: alert, oriented, normal speech, no focal findings or movement disorder noted       Results Review:   None        Assessment/Plan   Encounter Diagnoses   Name Primary?   • Class 2 severe  obesity due to excess calories with serious comorbidity and body mass index (BMI) of 38.0 to 38.9 in adult (CMS/[facility]) Yes   • Essential hypertension    • Obstructive sleep apnea syndrome    • Gastroesophageal reflux disease, esophagitis presence not specified          1. Vignesh Alvarez was seen today for follow-up, obesity, nutrition counseling and weight loss. He has lost 6 lbs of weight since his last visit, making his Body mass index is 38.39 kg/m².. Today we discussed consistency with healthy changes in lifestyle, diet, and exercise for long term success. Vignesh Alvarez had received handouts to him explaining the recommendation on portion sizes/appetite control/reading nutrition labels. Intensive behavioral therapy for obesity was done today as well.    Goals for this month are: return to follow the meal prescription as provided focusing on eating 4 meals/day with vegetables at every meal, eliminating carbohydrates after lunch, and getting adequate water and protein intake. Patient encouraged to call with questions and/or struggles as they may arise prior to next scheduled appointment.    2. Current comorbid conditions of hypertension, MOUNIKA, and GERD associated with his morbid obesity are reported to be stable on his current treatment regimen and medications. We anticipate the comorbid conditions to improve as we address his morbid obesity.    Follow up in 1 month for a weight recheck.    SID Gavin    12/18/19  2:06 PM  Patient Care Team:  Sergio Delarosa MD as PCP - General (Family Medicine)  Sergio Delarosa MD as Referring Physician (Family Medicine)  Abdirahman Quinonez MD as Cardiologist (Cardiology)

## 2020-01-10 ENCOUNTER — TELEPHONE (OUTPATIENT)
Dept: BARIATRICS/WEIGHT MGMT | Facility: CLINIC | Age: 55
End: 2020-01-10

## 2020-01-20 ENCOUNTER — OFFICE VISIT (OUTPATIENT)
Dept: BARIATRICS/WEIGHT MGMT | Facility: CLINIC | Age: 55
End: 2020-01-20

## 2020-01-20 VITALS
SYSTOLIC BLOOD PRESSURE: 156 MMHG | OXYGEN SATURATION: 98 % | WEIGHT: 311.6 LBS | TEMPERATURE: 98 F | HEIGHT: 76 IN | HEART RATE: 70 BPM | BODY MASS INDEX: 37.94 KG/M2 | DIASTOLIC BLOOD PRESSURE: 94 MMHG

## 2020-01-20 DIAGNOSIS — G47.33 OBSTRUCTIVE SLEEP APNEA SYNDROME: ICD-10-CM

## 2020-01-20 DIAGNOSIS — E66.01 CLASS 2 SEVERE OBESITY DUE TO EXCESS CALORIES WITH SERIOUS COMORBIDITY AND BODY MASS INDEX (BMI) OF 37.0 TO 37.9 IN ADULT (HCC): Primary | ICD-10-CM

## 2020-01-20 DIAGNOSIS — I10 ESSENTIAL HYPERTENSION: ICD-10-CM

## 2020-01-20 PROCEDURE — 99213 OFFICE O/P EST LOW 20 MIN: CPT | Performed by: NURSE PRACTITIONER

## 2020-02-25 ENCOUNTER — OFFICE VISIT (OUTPATIENT)
Dept: BARIATRICS/WEIGHT MGMT | Facility: CLINIC | Age: 55
End: 2020-02-25

## 2020-02-25 VITALS
HEIGHT: 76 IN | WEIGHT: 308 LBS | HEART RATE: 66 BPM | OXYGEN SATURATION: 98 % | SYSTOLIC BLOOD PRESSURE: 109 MMHG | BODY MASS INDEX: 37.51 KG/M2 | TEMPERATURE: 98.9 F | DIASTOLIC BLOOD PRESSURE: 66 MMHG

## 2020-02-25 DIAGNOSIS — G47.33 OBSTRUCTIVE SLEEP APNEA SYNDROME: ICD-10-CM

## 2020-02-25 DIAGNOSIS — I10 ESSENTIAL HYPERTENSION: ICD-10-CM

## 2020-02-25 DIAGNOSIS — K21.9 GASTROESOPHAGEAL REFLUX DISEASE, ESOPHAGITIS PRESENCE NOT SPECIFIED: ICD-10-CM

## 2020-02-25 DIAGNOSIS — E66.01 CLASS 2 SEVERE OBESITY DUE TO EXCESS CALORIES WITH SERIOUS COMORBIDITY AND BODY MASS INDEX (BMI) OF 37.0 TO 37.9 IN ADULT (HCC): Primary | ICD-10-CM

## 2020-02-25 PROCEDURE — 99213 OFFICE O/P EST LOW 20 MIN: CPT | Performed by: NURSE PRACTITIONER

## 2020-02-25 NOTE — PROGRESS NOTES
Patient Care Team:  Sergio Delarosa MD as PCP - General (Family Medicine)  Sergio Delarosa MD as Referring Physician (Family Medicine)  Abdirahman Quinonez MD as Cardiologist (Cardiology)    Reason for Visit:  Medical Weight Loss    Subjective        Vignesh Alvarez is a 55 y.o. year old male who is here for follow-up and continued medical management of morbid obesity. His current Body mass index is 37.49 kg/m². He states he suffers from high blood pressure, sleep apnea, and morbid obesity due to weight gain. He is currently following the 4 meals/day diet prescription. Vignesh Alvarez previously agreed to incorporate the prescription as provided, while also increasing physical exercise. Patient states he has been successful at eliminating carbohydrates after lunch, getting adequate water intake, and avoiding sodas. He does admit to only eating 2-3 meals/day and not as many vegetables as in the previous months. He has been exercising by walking 30-60 minutes. Vignesh Alvarez also states he has been drinking 64 ounces of water and getting 40 grams of protein intake per day. He has lost 3 lbs of weight since his last visit.    Review of Systems  Negative except the below listed  Gastrointestinal ROS: positive for - abdominal pain  Musculoskeletal ROS: positive for - joint pain    History  Past Medical History:   Diagnosis Date   • Anemia    • Fragoso's esophagus      Dr Gertrude Woods [place]    • Dyspnea on exertion    • GERD (gastroesophageal reflux disease)    • Hypertension    • Joint pain    • Sleep apnea     uses cpap     Past Surgical History:   Procedure Laterality Date   • REPLACEMENT TOTAL KNEE      RIGHT - 12/2018   • TONSILLECTOMY  1969     Family History   Problem Relation Age of Onset   • Hypertension Mother    • Diabetes Mother    • Heart failure Father    • Hypertension Father    • Arthritis Father    • Heart disease Father      Social History     Tobacco Use   • Smoking status: Never Smoker   •  Smokeless tobacco: Never Used   Substance Use Topics   • Alcohol use: Yes     Comment: OCC   • Drug use: No       (Not in a hospital admission)  Allergies:  Patient has no known allergies.      Current Outpatient Medications:   •  aspirin 81 MG tablet, Take 81 mg by mouth Daily., Disp: , Rfl:   •  hydrochlorothiazide (HYDRODIURIL) 25 MG tablet, Take 25 mg by mouth Daily., Disp: , Rfl:   •  linaclotide (LINZESS) 72 MCG capsule capsule, Take 72 mcg by mouth Daily., Disp: , Rfl:   •  metFORMIN (GLUCOPHAGE) 1000 MG tablet, Take 1,000 mg by mouth 2 (Two) Times a Day., Disp: , Rfl:   •  metoprolol tartrate (LOPRESSOR) 25 MG tablet, Take 1 tablet by mouth 2 (Two) Times a Day., Disp: 180 tablet, Rfl: 3  •  OMEPRAZOLE PO, Take 20 mg by mouth Daily., Disp: , Rfl:   •  Testosterone Cypionate (DEPOTESTOTERONE CYPIONATE) 200 MG/ML injection, INJECT 1.5MLS EVERY TWO WEEKS, Disp: , Rfl: 2  •  trandolapril-verapamil (TARKA) 4-240 MG per CR tablet, Take 1 tablet by mouth Daily., Disp: , Rfl: 1    Objective     Vital Signs  Temp:  [98.9 °F (37.2 °C)] 98.9 °F (37.2 °C)  Heart Rate:  [66] 66  BP: (109)/(66) 109/66  Body mass index is 37.49 kg/m².      02/25/20  1502   Weight: (!) 140 kg (308 lb)       Physical Exam:  HEENT: extra ocular movement intact  Respiratory:appears well, vitals normal, no respiratory distress, acyanotic, normal RR, chest clear, no wheezing, crepitations, rhonchi, normal symmetric air entry  Cardiovascular: regular rate and rhythm  GI: Soft, non-tender, normal bowel sounds; no bruits, organomegaly or masses. Abnormal shape: Obese  Musculoskeletal: inspection - no abnormality, range of motion normal  Neurologic: alert, oriented, normal speech, no focal findings or movement disorder noted       Results Review:   None        Assessment/Plan   Encounter Diagnoses   Name Primary?   • Class 2 severe obesity due to excess calories with serious comorbidity and body mass index (BMI) of 37.0 to 37.9 in adult (CMS/[facility]) Yes    • Essential hypertension    • Obstructive sleep apnea syndrome    • Gastroesophageal reflux disease, esophagitis presence not specified          1. Vignesh Alvarez was seen today for follow-up, obesity, nutrition counseling and weight loss. He has lost 3 lbs of weight since his last visit, making his Body mass index is 37.49 kg/m².. Today we discussed consistency with healthy changes in lifestyle, diet, and exercise for long term success. Vignesh Alvarez had received handouts to him explaining the recommendation on portion sizes/appetite control/reading nutrition labels. Intensive behavioral therapy for obesity was done today as well.    Goals for this month are: continue to work towards following the meal prescription as provided focusing on eating 4 meals/day with vegetables at every meal, eliminating carbohydrates after lunch, and getting adequate water and protein intake. Patient encouraged to call with questions and/or struggles as they may arise prior to next scheduled appointment.    2. Current comorbid conditions of hypertension, MOUNIKA, and GERD associated with his morbid obesity are reported to be stable on his current treatment regimen and medications. We anticipate the comorbid conditions to improve as we address his morbid obesity.    Follow up in 1 month for a weight recheck.    SID Gavin    02/25/20  4:31 PM  Patient Care Team:  Sergio Delarosa MD as PCP - General (Family Medicine)  Sergio Delarosa MD as Referring Physician (Family Medicine)  Abdirahman Quinonez MD as Cardiologist (Cardiology)

## 2020-05-05 ENCOUNTER — TELEMEDICINE (OUTPATIENT)
Dept: CARDIOLOGY | Facility: CLINIC | Age: 55
End: 2020-05-05

## 2020-05-05 VITALS
SYSTOLIC BLOOD PRESSURE: 140 MMHG | BODY MASS INDEX: 35.42 KG/M2 | DIASTOLIC BLOOD PRESSURE: 85 MMHG | HEIGHT: 77 IN | WEIGHT: 300 LBS

## 2020-05-05 DIAGNOSIS — G47.33 OBSTRUCTIVE SLEEP APNEA SYNDROME: ICD-10-CM

## 2020-05-05 DIAGNOSIS — K22.70 BARRETT'S ESOPHAGUS WITHOUT DYSPLASIA: ICD-10-CM

## 2020-05-05 DIAGNOSIS — I10 ESSENTIAL HYPERTENSION: Primary | ICD-10-CM

## 2020-05-05 DIAGNOSIS — E66.01 CLASS 2 SEVERE OBESITY DUE TO EXCESS CALORIES WITH SERIOUS COMORBIDITY AND BODY MASS INDEX (BMI) OF 39.0 TO 39.9 IN ADULT (HCC): ICD-10-CM

## 2020-05-05 DIAGNOSIS — I47.29 NSVT (NONSUSTAINED VENTRICULAR TACHYCARDIA) (HCC): ICD-10-CM

## 2020-05-05 DIAGNOSIS — K21.9 GASTROESOPHAGEAL REFLUX DISEASE, ESOPHAGITIS PRESENCE NOT SPECIFIED: ICD-10-CM

## 2020-05-05 DIAGNOSIS — I49.3 PVC (PREMATURE VENTRICULAR CONTRACTION): ICD-10-CM

## 2020-05-05 DIAGNOSIS — R00.2 PALPITATIONS: ICD-10-CM

## 2020-05-05 DIAGNOSIS — M25.551 PAIN OF RIGHT HIP JOINT: ICD-10-CM

## 2020-05-05 DIAGNOSIS — R06.09 DOE (DYSPNEA ON EXERTION): ICD-10-CM

## 2020-05-05 PROCEDURE — 99214 OFFICE O/P EST MOD 30 MIN: CPT | Performed by: INTERNAL MEDICINE

## 2020-05-05 NOTE — PROGRESS NOTES
Vignesh Alvarez  [id number]  1965  55 y.o.  male      You have chosen to receive care through a telehealth visit.  Do you consent to use a video/audio connection for your medical care today? Yes      Referring Provider: Sergio Delarosa MD    Reason for  Visit: Routine virtual visit   prior visit for shortness of breath and palpitations  Chest pressure now resolved   Diagnosed with obstructive sleep apnea now CPAP since September 2019    Subjective    Overall feels much better  Has intentional weight loss of 45 pounds  Denies any chest pain  No palpitations  Holter shows dramatic improvement in premature ventricular contractions and no further runs of nonsustained ventricular tachycardia  He is seeing Dr. Gunderson on a regular basis and following medical weight loss instructions diligently  No new events or occurrences  Has shortness of breath on exertion which is stable  Blood pressure is intermittently elevated  Continues to use CPAP  He has started this since September 2019  No presyncope or syncope  No orthopnea  No paroxysmal nocturnal dyspnea  Tries to increase his activity  Daytime fatigue and snoring has improved  Compliant with diet  Compliant with prescribed medication regimen    History of present illness:  Vignesh Alvarez is a 55 y.o. yo male with history of Essential Hypertension   who presents today for   No chief complaint on file.  .    History  Past Medical History:   Diagnosis Date   • Anemia    • Fragoso's esophagus      Dr Gertrude Woods [place]    • Dyspnea on exertion    • GERD (gastroesophageal reflux disease)    • Hypertension    • Joint pain    • Sleep apnea     uses cpap   ,   Past Surgical History:   Procedure Laterality Date   • REPLACEMENT TOTAL KNEE      RIGHT - 12/2018   • TONSILLECTOMY  1969   ,   Family History   Problem Relation Age of Onset   • Hypertension Mother    • Diabetes Mother    • Heart failure Father    • Hypertension Father    • Arthritis Father    • Heart disease  "Father    ,   Social History     Tobacco Use   • Smoking status: Never Smoker   • Smokeless tobacco: Never Used   Substance Use Topics   • Alcohol use: Yes     Comment: OCC   • Drug use: No   ,     Medications  Current Outpatient Medications   Medication Sig Dispense Refill   • aspirin 81 MG tablet Take 81 mg by mouth Daily.     • hydrochlorothiazide (HYDRODIURIL) 25 MG tablet Take 25 mg by mouth Daily.     • linaclotide (LINZESS) 72 MCG capsule capsule Take 72 mcg by mouth Daily.     • metFORMIN (GLUCOPHAGE) 1000 MG tablet Take 1,000 mg by mouth 2 (Two) Times a Day.     • metoprolol tartrate (LOPRESSOR) 25 MG tablet Take 1 tablet by mouth 2 (Two) Times a Day. 180 tablet 3   • OMEPRAZOLE PO Take 20 mg by mouth Daily.     • Testosterone Cypionate (DEPOTESTOTERONE CYPIONATE) 200 MG/ML injection INJECT 1.5MLS EVERY TWO WEEKS  2   • trandolapril-verapamil (TARKA) 4-240 MG per CR tablet Take 1 tablet by mouth Daily.  1     No current facility-administered medications for this visit.        Allergies:  Patient has no known allergies.    Review of Systems  Review of Systems   Constitution: Positive for malaise/fatigue and weight loss.   HENT: Negative.    Eyes: Negative.    Cardiovascular: Positive for dyspnea on exertion. Negative for chest pain, claudication, cyanosis, irregular heartbeat, leg swelling, near-syncope, orthopnea, palpitations, paroxysmal nocturnal dyspnea and syncope.   Respiratory: Negative.    Endocrine: Negative.    Hematologic/Lymphatic: Negative.    Skin: Negative.    Musculoskeletal: Positive for arthritis.   Gastrointestinal: Negative for anorexia.   Genitourinary: Negative.    Neurological: Positive for weakness.   Psychiatric/Behavioral: Negative.        Objective     Physical Exam:  /85   Ht 195.6 cm (77\")   Wt 136 kg (300 lb)   BMI 35.57 kg/m²   Self-reported as above  • General appearance is normal  • Normal judgment and insight  • Normal assessment of mental status, which may " include:  - Orientation to time, place, and person  - Recent and remote memory  - Mood and affect  • Normal external appearance of the ears and nose  • Normal assessment of hearing  • Normal external appearance of lips  • Normal external appearance of anterior surface of neck  • Normal respiratory effort  • No reported lower extremity edema  • No obvious skin abnormalities of visualized skin surfaces    • Normal examination of digits and nails (clubbing, cyanosis ,ischemia)  • No obvious abnormalities of visualized joint or musculature  • Normalized range of motion of visualized muscle groups       Results Review:    Vignesh Alvarez   Holter Monitor - 24 Hour - Record/Scan Analysis With Report/Interp ([id number]) Clinic   Order# [id number]   Reading physician: Abdirahman Quinonez MD Ordering physician: Abdirahman Quinonez MD Study date: 20   Patient Information     Patient Name  Vignesh Alvarez MRN  [id number] Sex  Male  (Age)  1965 (55 y.o.)   Interpretation Summary        · Average HR: 71. Min HR: 54. Max HR: 116.     · Entire report was reviewed.  Monitoring in days: ~1  · The predominant rhythm noted during the testing period was sinus rhythm.     · Rare supraventricular ectopics with an APC burden of: < 1%  · No significant supraventricular  tachy or kassandra arrhythmia.         · Rare premature ventricular contractions with a PVC burden of: < 1%  · No significant  ventricular tachy or kassandra arrhythmia.     · No correlated arrhythmia  · No significant pauses           Conclusion: Baseline rhythm is sinus.  No significant ectopy or arrhythmia.  Benign findings.            Results for orders placed during the hospital encounter of 19   Adult Stress Echo W/ Cont or Stress Agent if Necessary Per Protocol    Narrative · Estimated EF = 55%.  · Left ventricular systolic function is normal.  · Low risk stress test for stress induced myocardial ischemia       Interpretation Summary     · Estimated EF = 55%.  · Left  ventricular wall thickness is consistent with moderate concentric hypertrophy.  · Left ventricular diastolic dysfunction.  · Left atrial cavity size is mildly dilated.  · No evidence of pulmonary hypertension is present.        Reading physician: Abdirahman Quinonez MD Ordering physician: Abdirahman Quinonez MD Study date: 19   Patient Information     Patient Name  Vignesh Alvarez MRN  [id number] Sex  Male  (Age)  1965 (54 y.o.)   Interpretation Summary        · Average HR: 90. Min HR: 60. Max HR: 139.     · Monitored for ~1 day   · The predominant rhythm noted during the testing period was sinus rhythm.  · [id number]  premature ventricular contractions: PVC burden:  14.5 %   · 12 atrial premature contractions:  APC burden: <0.1%             · No significant supraventricular  tachy or kassandra arrhythmia.  · 9 runs of nonsustained ventricular tachycardia longest 4 beats at a maximum rate of 177 bpm.  · No patient diary available  · No significant pauses above 3 seconds     Conclusion: Baseline rhythm is sinus.  Frequent premature ventricular contractions with a total burden of 14.5%.  9 brief runs of nonsustained ventricular tachycardia.          Procedures    Assessment/Plan   Diagnoses and all orders for this visit:    Essential hypertension    NSVT (nonsustained ventricular tachycardia) (CMS/[facility])    Palpitations    PVC (premature ventricular contraction)    MOON (dyspnea on exertion)    Obstructive sleep apnea syndrome    Fragoso's esophagus without dysplasia    Class 2 severe obesity due to excess calories with serious comorbidity and body mass index (BMI) of 39.0 to 39.9 in adult (CMS/[facility])    Gastroesophageal reflux disease, esophagitis presence not specified    Pain of right hip joint        ____________________________________________________________________________________________________________________________________________  Health maintenance and recommendations      Similar recommendations as last visit      Offered to give patient  a copy of my notes       Questions were encouraged, asked and answered to the patient's  understanding and satisfaction. Questions if any regarding current medications and side effects, need for refills and importance of compliance to medications stressed.    Reviewed available prior notes, consults, prior visits, laboratory findings, radiology and cardiology relevant reports. Updated chart as applicable. I have reviewed the patient's medical history in detail and updated the computerized patient record as relevant.      Updated patient regarding any new or relevant abnormalities on review of records or any new findings on physical exam. Mentioned to patient about purpose of visit and desirable health short and long term goals and objectives.    Primary to monitor CBC CMP Lipid panel and TSH as applicable    ___________________________________________________________________________________________________________________________________________       Plan    No additional cardiac testing currently required  Discussed results of latest cardiac test with patient  His Holter monitor shows dramatic improvement and resolution of significant ventricular ectopy and runs of nonsustained ventricular tachycardia  Advised him to increase beta-blockers as required and call for additional prescriptions  Continue working on his diet and losing weight  Continue using CPAP    Keep A1c less than 7 Primary to monitor, he is taking metformin for weight loss and not diabetes, surveillance for diabetes mellitus  Keep LDL below 70 mg/dl. Monitor liver and renal functions.   Monitor CBC, CMP, TSH (as indicated) and Lipid Panel by primary            Return in about 6 months (around 11/5/2020).

## 2020-11-06 ENCOUNTER — OFFICE VISIT (OUTPATIENT)
Dept: CARDIOLOGY | Facility: CLINIC | Age: 55
End: 2020-11-06

## 2020-11-06 VITALS
HEART RATE: 75 BPM | OXYGEN SATURATION: 98 % | SYSTOLIC BLOOD PRESSURE: 140 MMHG | HEIGHT: 77 IN | BODY MASS INDEX: 36.13 KG/M2 | DIASTOLIC BLOOD PRESSURE: 80 MMHG | WEIGHT: 306 LBS

## 2020-11-06 DIAGNOSIS — I47.29 NSVT (NONSUSTAINED VENTRICULAR TACHYCARDIA) (HCC): Primary | ICD-10-CM

## 2020-11-06 DIAGNOSIS — G47.33 OBSTRUCTIVE SLEEP APNEA SYNDROME: ICD-10-CM

## 2020-11-06 DIAGNOSIS — R06.09 DOE (DYSPNEA ON EXERTION): ICD-10-CM

## 2020-11-06 DIAGNOSIS — I49.3 PVC (PREMATURE VENTRICULAR CONTRACTION): ICD-10-CM

## 2020-11-06 DIAGNOSIS — I10 ESSENTIAL HYPERTENSION: ICD-10-CM

## 2020-11-06 DIAGNOSIS — K22.70 BARRETT'S ESOPHAGUS WITHOUT DYSPLASIA: ICD-10-CM

## 2020-11-06 PROCEDURE — 99213 OFFICE O/P EST LOW 20 MIN: CPT | Performed by: INTERNAL MEDICINE

## 2020-11-06 PROCEDURE — 93000 ELECTROCARDIOGRAM COMPLETE: CPT | Performed by: INTERNAL MEDICINE

## 2020-11-06 NOTE — PROGRESS NOTES
Vignesh Alvarez  [id number]  1965  55 y.o.  male         Referring Provider: Sergio Delarosa MD    Reason for  Visit: Routine   visit   prior visit for shortness of breath and palpitations  Chest pressure now resolved   Diagnosed with obstructive sleep apnea now CPAP since September 2019    Subjective    Overall feels the same   No new events or complaints since last visit   Overall the patient feels no major change from baseline symptoms   Similar symptoms as during last visit     Tolerating current medications well with no untoward side effects   Compliant with prescribed medication regimen. Tries to adhere to cardiac diet.      Wife was sick and he had a lot of mental stress, now better   Has intentional weight loss of 41 pounds    Denies any chest pain  No palpitations    Holter showed dramatic improvement in premature ventricular contractions and no further runs of nonsustained ventricular tachycardia  premature ventricular contractions burden < 1%  He is seeing Dr. Gunderson on a regular basis and following medical weight loss instructions diligently  No new events or occurrences    Has shortness of breath on exertion which is stable  Blood pressure is intermittently elevated  Continues to use CPAP    He has started this since September 2019  No presyncope or syncope  No orthopnea    No paroxysmal nocturnal dyspnea  Tries to increase his activity  Daytime fatigue and snoring has improved    Compliant with diet  Compliant with prescribed medication regimen  BP well controlled at home.       History of present illness:  Vignesh Alvarez is a 55 y.o. yo male with history of Essential Hypertension   who presents today for   Chief Complaint   Patient presents with   • Hypertension     1yr- pt states he has been really good, no symptoms at this time. no questions or concerns today.    • pvc   .    History  Past Medical History:   Diagnosis Date   • Anemia    • Fragoso's esophagus      Dr Gertrude [place]     • Dyspnea on exertion    • GERD (gastroesophageal reflux disease)    • Hypertension    • Joint pain    • Sleep apnea     uses cpap   • Umbilical hernia 03/2020   ,   Past Surgical History:   Procedure Laterality Date   • REPLACEMENT TOTAL KNEE      RIGHT - 12/2018   • TONSILLECTOMY  1969   ,   Family History   Problem Relation Age of Onset   • Hypertension Mother    • Diabetes Mother    • Heart failure Father    • Hypertension Father    • Arthritis Father    • Heart disease Father    ,   Social History     Tobacco Use   • Smoking status: Never Smoker   • Smokeless tobacco: Never Used   Substance Use Topics   • Alcohol use: Yes     Comment: OCC   • Drug use: No   ,     Medications  Current Outpatient Medications   Medication Sig Dispense Refill   • aspirin 81 MG tablet Take 81 mg by mouth Daily.     • hydrochlorothiazide (HYDRODIURIL) 25 MG tablet Take 25 mg by mouth Daily.     • linaclotide (LINZESS) 72 MCG capsule capsule Take 72 mcg by mouth Daily.     • metFORMIN (GLUCOPHAGE) 1000 MG tablet Take 1,000 mg by mouth 2 (Two) Times a Day.     • metoprolol tartrate (LOPRESSOR) 25 MG tablet Take 1 tablet by mouth 2 (Two) Times a Day. 180 tablet 3   • OMEPRAZOLE PO Take 20 mg by mouth Daily.     • Testosterone Cypionate (DEPOTESTOTERONE CYPIONATE) 200 MG/ML injection INJECT 1.5MLS EVERY TWO WEEKS  2   • trandolapril-verapamil (TARKA) 4-240 MG per CR tablet Take 1 tablet by mouth Daily.  1     No current facility-administered medications for this visit.        Allergies:  Patient has no known allergies.    Review of Systems  Review of Systems   Constitution: Positive for malaise/fatigue and weight loss.   HENT: Negative.    Eyes: Negative.    Cardiovascular: Positive for dyspnea on exertion. Negative for chest pain, claudication, cyanosis, irregular heartbeat, leg swelling, near-syncope, orthopnea, palpitations, paroxysmal nocturnal dyspnea and syncope.   Respiratory: Negative.    Endocrine: Negative.   "  Hematologic/Lymphatic: Negative.    Skin: Negative.    Musculoskeletal: Positive for arthritis.   Gastrointestinal: Negative for anorexia.   Genitourinary: Negative.    Neurological: Positive for weakness.   Psychiatric/Behavioral: Negative.        Objective     Physical Exam:  /80   Pulse 75   Ht 195.6 cm (77\")   Wt (!) 139 kg (306 lb)   SpO2 98%   BMI 36.29 kg/m²        Physical Exam  Constitutional:       Appearance: He is well-developed.   HENT:      Head: Normocephalic.   Neck:      Musculoskeletal: No edema.      Vascular: Normal carotid pulses. No carotid bruit or JVD.      Trachea: No tracheal tenderness or tracheal deviation.   Cardiovascular:      Rate and Rhythm: Regular rhythm.      Pulses: Normal pulses.      Heart sounds: Normal heart sounds.   Pulmonary:      Effort: Pulmonary effort is normal.      Breath sounds: No stridor.   Abdominal:      General: There is no distension.      Palpations: Abdomen is soft.      Tenderness: There is no abdominal tenderness.   Skin:     General: Skin is warm.   Neurological:      Mental Status: He is alert.      Cranial Nerves: No cranial nerve deficit.      Sensory: No sensory deficit.   Psychiatric:         Speech: Speech normal.         Behavior: Behavior normal.             Results Review:    Vignesh Alvarez   Holter Monitor - 24 Hour - Record/Scan Analysis With Report/Interp ([id number]) Clinic   Order# [id number]   Reading physician: Abdirahman Quinonez MD Ordering physician: Abdirahman Quinonez MD Study date: 20   Patient Information     Patient Name  Vignesh Alvarez MRN  [id number] Sex  Male  (Age)  1965 (55 y.o.)   Interpretation Summary        · Average HR: 71. Min HR: 54. Max HR: 116.     · Entire report was reviewed.  Monitoring in days: ~1  · The predominant rhythm noted during the testing period was sinus rhythm.     · Rare supraventricular ectopics with an APC burden of: < 1%  · No significant supraventricular  tachy or kassandra arrhythmia.       "   · Rare premature ventricular contractions with a PVC burden of: < 1%  · No significant  ventricular tachy or kassandra arrhythmia.     · No correlated arrhythmia  · No significant pauses           Conclusion: Baseline rhythm is sinus.  No significant ectopy or arrhythmia.  Benign findings.            Results for orders placed during the hospital encounter of 19   Adult Stress Echo W/ Cont or Stress Agent if Necessary Per Protocol    Narrative · Estimated EF = 55%.  · Left ventricular systolic function is normal.  · Low risk stress test for stress induced myocardial ischemia       Interpretation Summary     · Estimated EF = 55%.  · Left ventricular wall thickness is consistent with moderate concentric hypertrophy.  · Left ventricular diastolic dysfunction.  · Left atrial cavity size is mildly dilated.  · No evidence of pulmonary hypertension is present.        Reading physician: Abdirahman Quinonez MD Ordering physician: Abdirahman Quinonez MD Study date: 19   Patient Information     Patient Name  Vignesh Alvarez MRN  [id number] Sex  Male  (Age)  1965 (54 y.o.)   Interpretation Summary        · Average HR: 90. Min HR: 60. Max HR: 139.     · Monitored for ~1 day   · The predominant rhythm noted during the testing period was sinus rhythm.  · 04955  premature ventricular contractions: PVC burden:  14.5 %   · 12 atrial premature contractions:  APC burden: <0.1%             · No significant supraventricular  tachy or kassandra arrhythmia.  · 9 runs of nonsustained ventricular tachycardia longest 4 beats at a maximum rate of 177 bpm.  · No patient diary available  · No significant pauses above 3 seconds     Conclusion: Baseline rhythm is sinus.  Frequent premature ventricular contractions with a total burden of 14.5%.  9 brief runs of nonsustained ventricular tachycardia.            ECG 12 Lead    Date/Time: 2020 1:32 PM  Performed by: Abdirahman Quinonez MD  Authorized by: Abdirahman Quinonez MD   Comparison: compared with  previous ECG from 10/29/2019  Comparison to previous ECG: QTc prolongation   Rhythm: sinus rhythm  Rate: normal  Conduction: conduction normal  ST Segments: ST segments normal  QRS axis: normal  Other findings: prolonged QTc interval    Clinical impression: abnormal EKG            Assessment/Plan   Diagnoses and all orders for this visit:    1. NSVT (nonsustained ventricular tachycardia) (CMS/HCC) (Primary)    2. PVC (premature ventricular contraction)    3. Essential hypertension    4. MOON (dyspnea on exertion)    5. Obstructive sleep apnea syndrome    6. Fragoso's esophagus without dysplasia    Other orders  -     ECG 12 Lead      Plan    Overall doing well no new cardiovascular symptoms and therefore no additional cardiac testing is required   The current medical regimen is effective;  continue present plan and medications.   Continue beta blocker therapy     Continue working on his diet and losing more weight  Continue using CPAP    Keep A1c less than 7 Primary to monitor, he is taking metformin for weight loss and not diabetes, surveillance for diabetes mellitus  Keep LDL below 70 mg/dl. Monitor liver and renal functions.   Monitor CBC, CMP, TSH (as indicated) and Lipid Panel by primary      Discussed QT prolongation   Monitor electrolutes        Return in about 6 months (around 5/6/2021).

## 2021-05-10 ENCOUNTER — OFFICE VISIT (OUTPATIENT)
Dept: CARDIOLOGY | Facility: CLINIC | Age: 56
End: 2021-05-10

## 2021-05-10 VITALS
HEART RATE: 73 BPM | BODY MASS INDEX: 37.19 KG/M2 | DIASTOLIC BLOOD PRESSURE: 96 MMHG | WEIGHT: 315 LBS | SYSTOLIC BLOOD PRESSURE: 152 MMHG | HEIGHT: 77 IN

## 2021-05-10 DIAGNOSIS — I47.29 NSVT (NONSUSTAINED VENTRICULAR TACHYCARDIA) (HCC): ICD-10-CM

## 2021-05-10 DIAGNOSIS — E66.01 CLASS 2 SEVERE OBESITY DUE TO EXCESS CALORIES WITH SERIOUS COMORBIDITY AND BODY MASS INDEX (BMI) OF 39.0 TO 39.9 IN ADULT (HCC): ICD-10-CM

## 2021-05-10 DIAGNOSIS — G47.33 OBSTRUCTIVE SLEEP APNEA SYNDROME: Primary | ICD-10-CM

## 2021-05-10 DIAGNOSIS — R06.09 DOE (DYSPNEA ON EXERTION): ICD-10-CM

## 2021-05-10 DIAGNOSIS — I49.3 PVC (PREMATURE VENTRICULAR CONTRACTION): ICD-10-CM

## 2021-05-10 DIAGNOSIS — R00.2 PALPITATIONS: ICD-10-CM

## 2021-05-10 DIAGNOSIS — K21.9 GASTROESOPHAGEAL REFLUX DISEASE, UNSPECIFIED WHETHER ESOPHAGITIS PRESENT: ICD-10-CM

## 2021-05-10 DIAGNOSIS — K22.70 BARRETT'S ESOPHAGUS WITHOUT DYSPLASIA: ICD-10-CM

## 2021-05-10 DIAGNOSIS — I10 ESSENTIAL HYPERTENSION: ICD-10-CM

## 2021-05-10 PROCEDURE — 93000 ELECTROCARDIOGRAM COMPLETE: CPT | Performed by: INTERNAL MEDICINE

## 2021-05-10 PROCEDURE — 99214 OFFICE O/P EST MOD 30 MIN: CPT | Performed by: INTERNAL MEDICINE

## 2021-05-10 RX ORDER — METOPROLOL TARTRATE 50 MG/1
50 TABLET, FILM COATED ORAL 2 TIMES DAILY
Qty: 180 TABLET | Refills: 3 | Status: SHIPPED | OUTPATIENT
Start: 2021-05-10

## 2021-05-10 NOTE — PROGRESS NOTES
Vignesh Alvarez  [id number]  1965  56 y.o.  male         Referring Provider: Sergio Delarosa MD    Reason for  Visit: Routine visit   prior visit for shortness of breath and palpitations  Chest pressure now resolved   Diagnosed with obstructive sleep apnea now CPAP since September 2019  Now with nocturnal palpitations       Subjective    Mild chronic exertional shortness of breath on exertion relieved with rest  No significant cough or wheezing    No palpitations  No associated chest pain  No significant pedal edema    No fever or chills  No significant expectoration    No hemoptysis  No presyncope or syncope    Tolerating current medications well with no untoward side effects   Compliant with prescribed medication regimen. Tries to adhere to cardiac diet.     BP elevated at home     Intermittent palpitations, once every several days to several weeks lasting for less than 1 minute  Usually at night that wakes him up   No associated symptoms of dizziness, weakness, chest pain,  shortness of breath      Joint pain in small, medium and large joints   Under lot of mental stress due to family issues     obstructive sleep apnea on CPAP     No bleeding, excessive bruising, gait instability or fall risks          History of present illness:  Vignesh Alvarez is a 56 y.o. yo male with history of essential hypertension who presents today for   Chief Complaint   Patient presents with   • Follow-up   .    History  Past Medical History:   Diagnosis Date   • Anemia    • Fragoso's esophagus      Dr Gertrude Woods [place]    • Dyspnea on exertion    • GERD (gastroesophageal reflux disease)    • Hypertension    • Joint pain    • Sleep apnea     uses cpap   • Umbilical hernia 03/2020   ,   Past Surgical History:   Procedure Laterality Date   • REPLACEMENT TOTAL KNEE      RIGHT - 12/2018   • TONSILLECTOMY  1969   ,   Family History   Problem Relation Age of Onset   • Hypertension Mother    • Diabetes Mother    • Heart failure  "Father    • Hypertension Father    • Arthritis Father    • Heart disease Father    ,   Social History     Tobacco Use   • Smoking status: Never Smoker   • Smokeless tobacco: Never Used   Substance Use Topics   • Alcohol use: Yes     Comment: OCC   • Drug use: No   ,     Medications  Current Outpatient Medications   Medication Sig Dispense Refill   • aspirin 81 MG tablet Take 81 mg by mouth Daily.     • hydrochlorothiazide (HYDRODIURIL) 25 MG tablet Take 25 mg by mouth Daily.     • linaclotide (LINZESS) 72 MCG capsule capsule Take 72 mcg by mouth Daily.     • metFORMIN (GLUCOPHAGE) 1000 MG tablet Take 1,000 mg by mouth 2 (Two) Times a Day.     • metoprolol tartrate (LOPRESSOR) 50 MG tablet Take 1 tablet by mouth 2 (Two) Times a Day. 180 tablet 3   • OMEPRAZOLE PO Take 20 mg by mouth Daily.     • Testosterone Cypionate (DEPOTESTOTERONE CYPIONATE) 200 MG/ML injection INJECT 1.5MLS EVERY TWO WEEKS  2   • trandolapril-verapamil (TARKA) 4-240 MG per CR tablet Take 1 tablet by mouth Daily.  1     No current facility-administered medications for this visit.       Allergies:  Patient has no known allergies.    Review of Systems  Review of Systems   Constitutional: Positive for malaise/fatigue and weight loss.   HENT: Negative.    Eyes: Negative.    Cardiovascular: Positive for dyspnea on exertion and palpitations. Negative for chest pain, claudication, cyanosis, irregular heartbeat, leg swelling, near-syncope, orthopnea, paroxysmal nocturnal dyspnea and syncope.   Respiratory: Negative.    Endocrine: Negative.    Hematologic/Lymphatic: Negative.    Skin: Negative.    Musculoskeletal: Positive for arthritis.   Gastrointestinal: Negative for anorexia.   Genitourinary: Negative.    Neurological: Positive for weakness.   Psychiatric/Behavioral: Negative.        Objective     Physical Exam:  /96   Pulse 73   Ht 195.6 cm (77\")   Wt (!) 145 kg (319 lb)   BMI 37.83 kg/m²        Physical Exam  Constitutional:       " Appearance: He is well-developed.   HENT:      Head: Normocephalic.   Neck:      Vascular: Normal carotid pulses. No carotid bruit or JVD.      Trachea: No tracheal tenderness or tracheal deviation.   Cardiovascular:      Rate and Rhythm: Regular rhythm.      Pulses: Normal pulses.      Heart sounds: Normal heart sounds.   Pulmonary:      Effort: Pulmonary effort is normal.      Breath sounds: No stridor.   Abdominal:      General: There is no distension.      Palpations: Abdomen is soft.      Tenderness: There is no abdominal tenderness.   Musculoskeletal:      Cervical back: No edema.   Skin:     General: Skin is warm.   Neurological:      Mental Status: He is alert.      Cranial Nerves: No cranial nerve deficit.      Sensory: No sensory deficit.   Psychiatric:         Speech: Speech normal.         Behavior: Behavior normal.             Results Review:    Vignesh Alvarez   Holter Monitor - 24 Hour - Record/Scan Analysis With Report/Interp ([id number]) Clinic   Order# [id number]   Reading physician: Abdirahman Quinonez MD Ordering physician: Abdirahman Quinonez MD Study date: 20   Patient Information     Patient Name  Vignesh Alvarez MRN  [id number] Sex  Male  (Age)  1965 (55 y.o.)   Interpretation Summary        · Average HR: 71. Min HR: 54. Max HR: 116.     · Entire report was reviewed.  Monitoring in days: ~1  · The predominant rhythm noted during the testing period was sinus rhythm.     · Rare supraventricular ectopics with an APC burden of: < 1%  · No significant supraventricular  tachy or kassandra arrhythmia.         · Rare premature ventricular contractions with a PVC burden of: < 1%  · No significant  ventricular tachy or kassandra arrhythmia.     · No correlated arrhythmia  · No significant pauses           Conclusion: Baseline rhythm is sinus.  No significant ectopy or arrhythmia.  Benign findings.            Results for orders placed during the hospital encounter of 19   Adult Stress Echo W/ Cont or Stress  Agent if Necessary Per Protocol    Narrative · Estimated EF = 55%.  · Left ventricular systolic function is normal.  · Low risk stress test for stress induced myocardial ischemia       Interpretation Summary     · Estimated EF = 55%.  · Left ventricular wall thickness is consistent with moderate concentric hypertrophy.  · Left ventricular diastolic dysfunction.  · Left atrial cavity size is mildly dilated.  · No evidence of pulmonary hypertension is present.        Reading physician: Abdirahman Quinonez MD Ordering physician: Abdirahman Quinonez MD Study date: 19   Patient Information     Patient Name  Vignesh Alvarez MRN  [id number] Sex  Male  (Age)  1965 (54 y.o.)   Interpretation Summary        · Average HR: 90. Min HR: 60. Max HR: 139.     · Monitored for ~1 day   · The predominant rhythm noted during the testing period was sinus rhythm.  · 79844  premature ventricular contractions: PVC burden:  14.5 %   · 12 atrial premature contractions:  APC burden: <0.1%             · No significant supraventricular  tachy or kassandra arrhythmia.  · 9 runs of nonsustained ventricular tachycardia longest 4 beats at a maximum rate of 177 bpm.  · No patient diary available  · No significant pauses above 3 seconds     Conclusion: Baseline rhythm is sinus.  Frequent premature ventricular contractions with a total burden of 14.5%.  9 brief runs of nonsustained ventricular tachycardia.            ECG 12 Lead    Date/Time: 5/10/2021 8:03 AM  Performed by: Abdirahman Quinonez MD  Authorized by: Abdirahman Quinonez MD   Comparison: compared with previous ECG from 2020  Similar to previous ECG  Rhythm: sinus rhythm  QRS axis: normal  Other findings: non-specific ST-T wave changes    Clinical impression: abnormal EKG            Assessment/Plan   Diagnoses and all orders for this visit:    1. Obstructive sleep apnea syndrome (Primary)    2. NSVT (nonsustained ventricular tachycardia) (CMS/[facility])  -     Holter Monitor - 72 Hour Up To 15 Days;  Future    3. Essential hypertension    4. PVC (premature ventricular contraction)  -     Holter Monitor - 72 Hour Up To 15 Days; Future    5. Class 2 severe obesity due to excess calories with serious comorbidity and body mass index (BMI) of 39.0 to 39.9 in adult (CMS/[facility])    6. Fragoso's esophagus without dysplasia    7. Gastroesophageal reflux disease, unspecified whether esophagitis present    8. Palpitations  -     Holter Monitor - 72 Hour Up To 15 Days; Future    9. MOON (dyspnea on exertion)    Other orders  -     ECG 12 Lead  -     metoprolol tartrate (LOPRESSOR) 50 MG tablet; Take 1 tablet by mouth 2 (Two) Times a Day.  Dispense: 180 tablet; Refill: 3      Plan    Orders Placed This Encounter   Procedures   • Holter Monitor - 72 Hour Up To 15 Days     Standing Status:   Future     Standing Expiration Date:   5/10/2022     Order Specific Question:   Reason for exam?     Answer:   Palpitations     Order Specific Question:   Release to patient     Answer:   Immediate   • ECG 12 Lead     This order was created via procedure documentation     Order Specific Question:   Release to patient     Answer:   Immediate        Patient was advised to continue CPAP daily.     Double beta blocker therapy   Requested Prescriptions     Signed Prescriptions Disp Refills   • metoprolol tartrate (LOPRESSOR) 50 MG tablet 180 tablet 3     Sig: Take 1 tablet by mouth 2 (Two) Times a Day.        Check BP and heart rates twice daily at least 3x / week, week a month  at home and bring a recording for me to review next visit  If BP >130/85 or < 100/60 persistently over 3 reading 30 mins apart call sooner      Monitor for any signs of bleeding including red or dark stools as well as easy bruisabilty. Fall precautions.       I support the patient's decision to take the Covid -19 vaccine   Had both dose   No major issues   Now fully immunized       ____________________________________________________________________________________________________________________________________________  Health maintenance and recommendations    Low salt/ HTN/ Heart healthy carbohydrate restricted cardiac diet (printed dietary and general instructions provided for home review )  The patient is advised to reduce or avoid caffeine or other cardiac stimulants.     The patient was advised to avoid long term NSAIDS , use Tylenol PRN instead  Avoid cardiac stimulants including common drugs like Pseudoephedrine or excessive amounts of caffeine  Monitor for any signs of bleeding including red or dark stools. Fall precautions.   Advised staying uptodate with immunizations per established standard guidelines.  Offered to give patient  a copy      Questions were encouraged, asked and answered to the patient's  understanding and satisfaction. Questions if any regarding current medications and side effects, need for refills and importance of compliance to medications stressed.    Reviewed available prior notes, consults, prior visits, laboratory findings, radiology and cardiology relevant reports. Updated chart as applicable. I have reviewed the patient's medical history in detail and updated the computerized patient record as relevant.      Updated patient regarding any new or relevant abnormalities on review of records or any new findings on physical exam. Mentioned to patient about purpose of visit and desirable health short and long term goals and objectives.    Primary to monitor CBC CMP Lipid panel and TSH as applicable    ___________________________________________________________________________________________________________________________________________              Return in about 2 months (around 7/10/2021).

## 2021-07-08 PROBLEM — I10 HYPERTENSION: Chronic | Status: ACTIVE | Noted: 2019-08-06

## 2021-07-08 NOTE — PATIENT INSTRUCTIONS
Obesity, Adult  Obesity is having too much body fat. Being obese means that your weight is more than what is healthy for you.  BMI is a number that explains how much body fat you have. If you have a BMI of 30 or more, you are obese. Obesity is often caused by eating or drinking more calories than your body uses. Changing your lifestyle can help you lose weight.  Obesity can cause serious health problems, such as:  · Stroke.  · Coronary artery disease (CAD).  · Type 2 diabetes.  · Some types of cancer, including cancers of the colon, breast, uterus, and gallbladder.  · Osteoarthritis.  · High blood pressure (hypertension).  · High cholesterol.  · Sleep apnea.  · Gallbladder stones.  · Infertility problems.  What are the causes?  · Eating meals each day that are high in calories, sugar, and fat.  · Being born with genes that may make you more likely to become obese.  · Having a medical condition that causes obesity.  · Taking certain medicines.  · Sitting a lot (having a sedentary lifestyle).  · Not getting enough sleep.  · Drinking a lot of drinks that have sugar in them.  What increases the risk?  · Having a family history of obesity.  · Being an  woman.  · Being a  man.  · Living in an area with limited access to:  ? [place], recreation centers, or sidewalks.  ? Healthy food choices, such as grocery stores and "[employer] markets.  What are the signs or symptoms?  The main sign is having too much body fat.  How is this treated?  · Treatment for this condition often includes changing your lifestyle. Treatment may include:  ? Changing your diet. This may include making a healthy meal plan.  ? Exercise. This may include activity that causes your heart to beat faster (aerobic exercise) and strength training. Work with your doctor to design a program that works for you.  ? Medicine to help you lose weight. This may be used if you are not able to lose 1 pound a week after 6 weeks of healthy eating and  more exercise.  ? Treating conditions that cause the obesity.  ? Surgery. Options may include gastric banding and gastric bypass. This may be done if:  § Other treatments have not helped to improve your condition.  § You have a BMI of 40 or higher.  § You have life-threatening health problems related to obesity.  Follow these instructions at home:  Eating and drinking    · Follow advice from your doctor about what to eat and drink. Your doctor may tell you to:  ? Limit fast food, sweets, and processed snack foods.  ? Choose low-fat options. For example, choose low-fat milk instead of whole milk.  ? Eat 5 or more servings of fruits or vegetables each day.  ? Eat at home more often. This gives you more control over what you eat.  ? Choose healthy foods when you eat out.  ? Learn to read food labels. This will help you learn how much food is in 1 serving.  ? Keep low-fat snacks available.  ? Avoid drinks that have a lot of sugar in them. These include soda, fruit juice, iced tea with sugar, and flavored milk.  · Drink enough water to keep your pee (urine) pale yellow.  · Do not go on fad diets.  Physical activity  · Exercise often, as told by your doctor. Most adults should get up to 150 minutes of moderate-intensity exercise every week.Ask your doctor:  ? What types of exercise are safe for you.  ? How often you should exercise.  · Warm up and stretch before being active.  · Do slow stretching after being active (cool down).  · Rest between times of being active.  Lifestyle  · Work with your doctor and a food expert (dietitian) to set a weight-loss goal that is best for you.  · Limit your screen time.  · Find ways to reward yourself that do not involve food.  · Do not drink alcohol if:  ? Your doctor tells you not to drink.  ? You are pregnant, may be pregnant, or are planning to become pregnant.  · If you drink alcohol:  ? Limit how much you use to:  § 0-1 drink a day for women.  § 0-2 drinks a day for men.  ? Be  aware of how much alcohol is in your drink. In the [place] one drink equals one 12 oz bottle of beer (355 mL), one 5 oz glass of wine (148 mL), or one 1½ oz glass of hard liquor (44 mL).  General instructions  · Keep a weight-loss journal. This can help you keep track of:  ? The food that you eat.  ? How much exercise you get.  · Take over-the-counter and prescription medicines only as told by your doctor.  · Take vitamins and supplements only as told by your doctor.  · Think about joining a support group.  · Keep all follow-up visits as told by your doctor. This is important.  Contact a doctor if:  · You cannot meet your weight loss goal after you have changed your diet and lifestyle for 6 weeks.  Get help right away if you:  · Are having trouble breathing.  · Are having thoughts of harming yourself.  Summary  · Obesity is having too much body fat.  · Being obese means that your weight is more than what is healthy for you.  · Work with your doctor to set a weight-loss goal.  · Get regular exercise as told by your doctor.  This information is not intended to replace advice given to you by your health care provider. Make sure you discuss any questions you have with your health care provider.  Document Revised: 08/22/2019 Document Reviewed: 08/22/2019  Elsevier Patient Education © 2021 [employer]

## 2021-07-08 NOTE — PROGRESS NOTES
Chief Complaint  No chief complaint on file.    Louise Alvarez presents to [facility] for routine follow-up of outpatient testing and medication adjustments.  Metoprolol tartrate was increased to 50 mg twice daily as last office visit on 5/10/2021.  14-day Holter monitor revealed predominantly sinus rhythm with rare supraventricular ectopic beats with APC burden of 0.02%, rare premature ventricular contractions with a PVC burden of 0.02%, 12 runs of supraventricular tachycardia with longest duration of 7 beats, no significant pauses and no correlated arrhythmias.  He has a history of hypertension, obstructive sleep apnea and obesity.  Patient denies chest pain, shortness of breath, palpitations, dizziness, syncope, orthopnea, PND, edema or decreased stamina.  Patient denies any signs of bleeding.  Hypertension  This is a chronic problem. The current episode started more than 1 year ago. The problem is controlled. Pertinent negatives include no anxiety, blurred vision, chest pain, headaches, malaise/fatigue, neck pain, orthopnea, palpitations, peripheral edema, PND, shortness of breath or sweats. Risk factors for coronary artery disease include male gender, obesity and dyslipidemia. Current antihypertension treatment includes beta blockers, diuretics, ACE inhibitors and calcium channel blockers. The current treatment provides significant improvement.       Objective   Vital Signs:   There were no vitals taken for this visit.    Vitals and nursing note reviewed.   Constitutional:       General: Awake.      Appearance: Normal and healthy appearance. Well-developed and not in distress. Obese.   Eyes:      General: Lids are normal.      Conjunctiva/sclera: Conjunctivae normal.      Pupils: Pupils are equal, round, and reactive to light.   HENT:      Head: Normocephalic and atraumatic.      Nose: Nose normal.   Neck:      Vascular: No JVR. JVD normal.   Pulmonary:       Effort: Pulmonary effort is normal.      Breath sounds: Normal breath sounds. No wheezing. No rhonchi. No rales.   Chest:      Chest wall: Not tender to palpatation.   Cardiovascular:      PMI at left midclavicular line. Normal rate. Regular rhythm. Normal S1. Normal S2.      Murmurs: There is no murmur.      No gallop. No click. No rub.   Pulses:     Intact distal pulses.   Edema:     Peripheral edema absent.   Abdominal:      General: Bowel sounds are normal.      Palpations: Abdomen is soft.      Tenderness: There is no abdominal tenderness.   Musculoskeletal: Normal range of motion.         General: No tenderness.      Cervical back: Normal range of motion. Skin:     General: Skin is warm and dry.   Neurological:      General: No focal deficit present.      Mental Status: Alert, oriented to person, place, and time and oriented to person, place and time.   Psychiatric:         Attention and Perception: Attention and perception normal.         Mood and Affect: Mood and affect normal.         Speech: Speech normal.         Behavior: Behavior normal. Behavior is cooperative.         Thought Content: Thought content normal.         Cognition and Memory: Cognition and memory normal.         Judgment: Judgment normal.        Result Review :   The following data was reviewed by: SID Ramos on 07/12/2021:      Data reviewed: Cardiology studies holter monitor 6/7/21          Assessment and Plan    Diagnoses and all orders for this visit:    1. Essential hypertension (Primary)-blood pressure well controlled.  Continue Tarka, HCTZ and metoprolol tartrate.  Monitor and record daily blood pressure. Report readings consistently higher than 140/90 or consistently lower than 100/60.     2. Obstructive sleep apnea syndrome-patient reports compliance with CPAP.    3. Class 2 severe obesity due to excess calories with serious comorbidity and body mass index (BMI) of 37.0 to 37.9 in adult (CMS/[facility])- Patient's There is  no height or weight on file to calculate BMI. indicating that he is obese (BMI >30). Obesity-related health conditions include the following: obstructive sleep apnea and hypertension. Obesity is unchanged. BMI is is above average; no BMI management plan is appropriate. We discussed low calorie, low carb based diet program, portion control and increasing exercise.        Follow Up   No follow-ups on file.  Patient was given instructions and counseling regarding his condition or for health maintenance advice. Please see specific information pulled into the AVS if appropriate.

## 2021-07-12 ENCOUNTER — OFFICE VISIT (OUTPATIENT)
Dept: CARDIOLOGY | Facility: CLINIC | Age: 56
End: 2021-07-12

## 2021-07-12 VITALS
BODY MASS INDEX: 36.48 KG/M2 | WEIGHT: 309 LBS | HEIGHT: 77 IN | HEART RATE: 72 BPM | SYSTOLIC BLOOD PRESSURE: 130 MMHG | OXYGEN SATURATION: 97 % | DIASTOLIC BLOOD PRESSURE: 90 MMHG

## 2021-07-12 DIAGNOSIS — K22.70 BARRETT'S ESOPHAGUS WITHOUT DYSPLASIA: ICD-10-CM

## 2021-07-12 DIAGNOSIS — M25.551 PAIN OF RIGHT HIP JOINT: ICD-10-CM

## 2021-07-12 DIAGNOSIS — I47.29 NSVT (NONSUSTAINED VENTRICULAR TACHYCARDIA) (HCC): ICD-10-CM

## 2021-07-12 DIAGNOSIS — K21.9 GASTROESOPHAGEAL REFLUX DISEASE, UNSPECIFIED WHETHER ESOPHAGITIS PRESENT: ICD-10-CM

## 2021-07-12 DIAGNOSIS — E66.01 CLASS 2 SEVERE OBESITY DUE TO EXCESS CALORIES WITH SERIOUS COMORBIDITY AND BODY MASS INDEX (BMI) OF 37.0 TO 37.9 IN ADULT (HCC): ICD-10-CM

## 2021-07-12 DIAGNOSIS — R06.09 DOE (DYSPNEA ON EXERTION): ICD-10-CM

## 2021-07-12 DIAGNOSIS — G47.33 OBSTRUCTIVE SLEEP APNEA SYNDROME: ICD-10-CM

## 2021-07-12 DIAGNOSIS — R00.2 PALPITATIONS: ICD-10-CM

## 2021-07-12 DIAGNOSIS — I10 ESSENTIAL HYPERTENSION: Primary | Chronic | ICD-10-CM

## 2021-07-12 DIAGNOSIS — I49.3 PVC (PREMATURE VENTRICULAR CONTRACTION): ICD-10-CM

## 2021-07-12 PROCEDURE — 99214 OFFICE O/P EST MOD 30 MIN: CPT | Performed by: INTERNAL MEDICINE

## 2021-07-12 RX ORDER — VERAPAMIL HYDROCHLORIDE 240 MG/1
240 TABLET, FILM COATED, EXTENDED RELEASE ORAL NIGHTLY
COMMUNITY
End: 2021-11-23

## 2021-07-12 RX ORDER — ATORVASTATIN CALCIUM 10 MG/1
10 TABLET, FILM COATED ORAL DAILY
COMMUNITY

## 2021-07-12 RX ORDER — OLMESARTAN MEDOXOMIL AND HYDROCHLOROTHIAZIDE 40/25 40; 25 MG/1; MG/1
1 TABLET ORAL DAILY
COMMUNITY

## 2021-07-12 RX ORDER — VERAPAMIL HYDROCHLORIDE 240 MG/1
240 TABLET, FILM COATED, EXTENDED RELEASE ORAL DAILY
Qty: 90 TABLET | Refills: 3 | Status: SHIPPED | OUTPATIENT
Start: 2021-07-12

## 2021-07-12 NOTE — PROGRESS NOTES
Vignesh Alvarez  [id number]  1965  56 y.o.  male         Referring Provider: Sergio Delarosa MD    Reason for  Visit: Routine visit   prior visit for shortness of breath and palpitations  Chest pressure now resolved     Diagnosed with obstructive sleep apnea now CPAP since September 2019  Now with nocturnal palpitations   Cardiac workup test results as below:14-day outpatient cardiac telemetry     Subjective    Overall feels the same   No new events or complaints since last visit   Overall the patient feels no major change from baseline symptoms   Similar symptoms as during last visit     Tolerating current medications well with no untoward side effects   Compliant with prescribed medication regimen. Tries to adhere to cardiac diet.      Intermittent palpitations, once every several days to several weeks lasting for less than 1 minute  Usually at night that wakes him up   No associated symptoms of dizziness, weakness, chest pain,  shortness of breath   Overall palpitations better on higher dose of beta blocker therapy      Mild chronic exertional shortness of breath on exertion relieved with rest  No significant cough or wheezing    No palpitations  No associated chest pain  No significant pedal edema    No fever or chills  No significant expectoration    No hemoptysis  No presyncope or syncope    Tolerating current medications well with no untoward side effects   Compliant with prescribed medication regimen. Tries to adhere to cardiac diet.     BP elevated at home in past now better   Double beta blocker therapy   Joint pain in small, medium and large joints   Under lot of mental stress due to family issues     obstructive sleep apnea on CPAP     No bleeding, excessive bruising, gait instability or fall risks          History of present illness:  Vignesh Alvarez is a 56 y.o. yo male with history of essential hypertension who presents today for   Chief Complaint   Patient presents with   • PVC     2 MO - S/P  14 DAY MONITOR.    • Hypertension     PT STATES BP -110 AND HAD A HEAD ACHE. PT TOOK ASA TO RELEIF AND BENDADRL   .    History  Past Medical History:   Diagnosis Date   • Anemia    • Fragoso's esophagus      Dr Gertrude [place]    • Dyspnea on exertion    • GERD (gastroesophageal reflux disease)    • Hypertension    • Joint pain    • Sleep apnea     uses cpap   • Umbilical hernia 03/2020   ,   Past Surgical History:   Procedure Laterality Date   • REPLACEMENT TOTAL KNEE      RIGHT - 12/2018   • TONSILLECTOMY  1969   ,   Family History   Problem Relation Age of Onset   • Hypertension Mother    • Diabetes Mother    • Heart failure Father    • Hypertension Father    • Arthritis Father    • Heart disease Father    ,   Social History     Tobacco Use   • Smoking status: Never Smoker   • Smokeless tobacco: Never Used   Substance Use Topics   • Alcohol use: Yes     Comment: OCC   • Drug use: No   ,     Medications  Current Outpatient Medications   Medication Sig Dispense Refill   • aspirin 81 MG tablet Take 81 mg by mouth Daily.     • atorvastatin (LIPITOR) 10 MG tablet Take 10 mg by mouth Daily.     • linaclotide (LINZESS) 72 MCG capsule capsule Take 72 mcg by mouth Daily.     • metFORMIN (GLUCOPHAGE) 1000 MG tablet Take 1,000 mg by mouth 2 (Two) Times a Day.     • metoprolol tartrate (LOPRESSOR) 50 MG tablet Take 1 tablet by mouth 2 (Two) Times a Day. (Patient taking differently: Take 100 mg by mouth 2 (Two) Times a Day.) 180 tablet 3   • olmesartan-hydrochlorothiazide (BENICAR HCT) 40-25 MG per tablet Take 1 tablet by mouth Daily.     • OMEPRAZOLE PO Take 20 mg by mouth Daily.     • Testosterone Cypionate (DEPOTESTOTERONE CYPIONATE) 200 MG/ML injection INJECT 1.5MLS EVERY TWO WEEKS  2   • verapamil SR (CALAN-SR) 240 MG CR tablet Take 240 mg by mouth Every Night.     • hydrochlorothiazide (HYDRODIURIL) 25 MG tablet Take 25 mg by mouth Daily.     • verapamil SR (CALAN-SR) 120 MG CR tablet Take 1 tablet by  "mouth Every Night. Take Calan  mg AM and Calan  mg PM 90 tablet 3   • verapamil SR (CALAN-SR) 240 MG CR tablet Take 1 tablet by mouth Daily. 90 tablet 3     No current facility-administered medications for this visit.       Allergies:  Patient has no known allergies.    Review of Systems  Review of Systems   Constitutional: Positive for malaise/fatigue and weight loss.   HENT: Negative.    Eyes: Negative.    Cardiovascular: Positive for dyspnea on exertion and palpitations. Negative for chest pain, claudication, cyanosis, irregular heartbeat, leg swelling, near-syncope, orthopnea, paroxysmal nocturnal dyspnea and syncope.   Respiratory: Negative.    Endocrine: Negative.    Hematologic/Lymphatic: Negative.    Skin: Negative.    Musculoskeletal: Positive for arthritis.   Gastrointestinal: Negative for anorexia.   Genitourinary: Negative.    Neurological: Positive for weakness.   Psychiatric/Behavioral: Negative.        Objective     Physical Exam:  /90   Pulse 72   Ht 195.6 cm (77\")   Wt (!) 140 kg (309 lb)   SpO2 97%   BMI 36.64 kg/m²        Physical Exam  Constitutional:       Appearance: He is well-developed.   HENT:      Head: Normocephalic.   Neck:      Vascular: Normal carotid pulses. No carotid bruit or JVD.      Trachea: No tracheal tenderness or tracheal deviation.   Cardiovascular:      Rate and Rhythm: Regular rhythm.      Pulses: Normal pulses.      Heart sounds: Normal heart sounds.   Pulmonary:      Effort: Pulmonary effort is normal.      Breath sounds: No stridor.   Abdominal:      General: There is no distension.      Palpations: Abdomen is soft.      Tenderness: There is no abdominal tenderness.   Musculoskeletal:      Cervical back: No edema.   Skin:     General: Skin is warm.   Neurological:      Mental Status: He is alert.      Cranial Nerves: No cranial nerve deficit.      Sensory: No sensory deficit.   Psychiatric:         Speech: Speech normal.         Behavior: Behavior " normal.             Results Review:    Vignesh Alvarez   Holter Monitor - 24 Hour - Record/Scan Analysis With Report/Interp ([id number]) Clinic   Order# [id number]   Reading physician: Abdirahman Quinonez MD Ordering physician: Abdirahman Quinonez MD Study date: 20   Patient Information     Patient Name  Vignesh Alvarez MRN  [id number] Sex  Male  (Age)  1965 (55 y.o.)   Interpretation Summary        · Average HR: 71. Min HR: 54. Max HR: 116.     · Entire report was reviewed.  Monitoring in days: ~1  · The predominant rhythm noted during the testing period was sinus rhythm.     · Rare supraventricular ectopics with an APC burden of: < 1%  · No significant supraventricular  tachy or kassandra arrhythmia.         · Rare premature ventricular contractions with a PVC burden of: < 1%  · No significant  ventricular tachy or kassandra arrhythmia.     · No correlated arrhythmia  · No significant pauses           Conclusion: Baseline rhythm is sinus.  No significant ectopy or arrhythmia.  Benign findings.            Results for orders placed during the hospital encounter of 19   Adult Stress Echo W/ Cont or Stress Agent if Necessary Per Protocol    Narrative · Estimated EF = 55%.  · Left ventricular systolic function is normal.  · Low risk stress test for stress induced myocardial ischemia       Interpretation Summary     · Estimated EF = 55%.  · Left ventricular wall thickness is consistent with moderate concentric hypertrophy.  · Left ventricular diastolic dysfunction.  · Left atrial cavity size is mildly dilated.  · No evidence of pulmonary hypertension is present.        Reading physician: Abdirahman Quinonez MD Ordering physician: Abdirahman Quinonez MD Study date: 19   Patient Information     Patient Name  Vigensh Alvarez MRN  [id number] Sex  Male  (Age)  1965 (54 y.o.)   Interpretation Summary        · Average HR: 90. Min HR: 60. Max HR: 139.     · Monitored for ~1 day   · The predominant rhythm noted during the testing period  was sinus rhythm.  · 14923  premature ventricular contractions: PVC burden:  14.5 %   · 12 atrial premature contractions:  APC burden: <0.1%             · No significant supraventricular  tachy or kassandra arrhythmia.  · 9 runs of nonsustained ventricular tachycardia longest 4 beats at a maximum rate of 177 bpm.  · No patient diary available  · No significant pauses above 3 seconds     Conclusion: Baseline rhythm is sinus.  Frequent premature ventricular contractions with a total burden of 14.5%.  9 brief runs of nonsustained ventricular tachycardia.          Procedures    Assessment/Plan   Diagnoses and all orders for this visit:    1. Essential hypertension (Primary)    2. Obstructive sleep apnea syndrome    3. Class 2 severe obesity due to excess calories with serious comorbidity and body mass index (BMI) of 37.0 to 37.9 in adult (CMS/[facility])    4. NSVT (nonsustained ventricular tachycardia) (CMS/[facility])    5. PVC (premature ventricular contraction)    6. Pain of right hip joint    7. Palpitations  -     Adult Transthoracic Echo Complete w/ Color, Spectral and Contrast if necessary per protocol; Future    8. MOON (dyspnea on exertion)  -     Adult Transthoracic Echo Complete w/ Color, Spectral and Contrast if necessary per protocol; Future    9. Fragoso's esophagus without dysplasia    10. Gastroesophageal reflux disease, unspecified whether esophagitis present    Other orders  -     verapamil SR (CALAN-SR) 240 MG CR tablet; Take 1 tablet by mouth Daily.  Dispense: 90 tablet; Refill: 3  -     verapamil SR (CALAN-SR) 120 MG CR tablet; Take 1 tablet by mouth Every Night. Take Calan  mg AM and Calan  mg PM  Dispense: 90 tablet; Refill: 3      Plan      Orders Placed This Encounter   Procedures   • Adult Transthoracic Echo Complete w/ Color, Spectral and Contrast if necessary per protocol     Myocardial strain to be performed during echocardiogram as long as technically feasible     Standing Status:   Future      Standing Expiration Date:   7/12/2022     Order Specific Question:   Reason for exam?     Answer:   Dyspnea     Order Specific Question:   Reason for exam?     Answer:   Palpitations     Order Specific Question:   Release to patient     Answer:   Immediate        Needs better BP control    Requested Prescriptions     Signed Prescriptions Disp Refills   • verapamil SR (CALAN-SR) 240 MG CR tablet 90 tablet 3     Sig: Take 1 tablet by mouth Daily.   • verapamil SR (CALAN-SR) 120 MG CR tablet 90 tablet 3     Sig: Take 1 tablet by mouth Every Night. Take Calan  mg AM and Calan  mg PM      Patient expressed understanding  Encouraged and answered all questions   Discussed with the patient and all questioned fully answered. He will call me if any problems arise.   Discussed results of prior testing with patient : 14-day outpatient cardiac telemetry   Reviewed and summarized recent test results      Patient was advised to continue CPAP daily.   Keep A1c less than 7 Primary to monitor  Keep LDL below 70 mg/dl. Monitor liver and renal functions.   Monitor CBC, CMP, TSH (as indicated) and Lipid Panel by primary       Check BP and heart rates twice daily at least 3x / week, week a month  at home and bring a recording for me to review next visit  If BP >130/85 or < 100/60 persistently over 3 reading 30 mins apart call sooner      Monitor for any signs of bleeding including red or dark stools as well as easy bruisabilty. Fall precautions.       I support the patient's decision to take the Covid -19 vaccine   Had both dose   No major issues   Now fully immunized      Follow up with any mid level provider working with me to address interim issues                Return in about 4 months (around 11/12/2021).

## 2021-08-30 ENCOUNTER — HOSPITAL ENCOUNTER (OUTPATIENT)
Dept: CARDIOLOGY | Facility: HOSPITAL | Age: 56
Discharge: HOME | End: 2021-08-30
Admitting: INTERNAL MEDICINE

## 2021-08-30 VITALS
BODY MASS INDEX: 36.48 KG/M2 | SYSTOLIC BLOOD PRESSURE: 141 MMHG | HEIGHT: 77 IN | WEIGHT: 309 LBS | DIASTOLIC BLOOD PRESSURE: 81 MMHG

## 2021-08-30 DIAGNOSIS — R06.09 DOE (DYSPNEA ON EXERTION): ICD-10-CM

## 2021-08-30 DIAGNOSIS — R00.2 PALPITATIONS: ICD-10-CM

## 2021-08-30 PROCEDURE — 25010000002 PERFLUTREN 6.52 MG/ML SUSPENSION: Performed by: INTERNAL MEDICINE

## 2021-08-30 PROCEDURE — 93306 TTE W/DOPPLER COMPLETE: CPT | Performed by: INTERNAL MEDICINE

## 2021-08-30 PROCEDURE — 93306 TTE W/DOPPLER COMPLETE: CPT

## 2021-08-30 RX ADMIN — PERFLUTREN 6.52 MG: 6.52 INJECTION, SUSPENSION INTRAVENOUS at 07:29

## 2021-09-01 LAB
BH CV ECHO MEAS - AO MAX PG (FULL): 0.26 MMHG
BH CV ECHO MEAS - AO MAX PG: 4.7 MMHG
BH CV ECHO MEAS - AO MEAN PG (FULL): 0 MMHG
BH CV ECHO MEAS - AO MEAN PG: 3 MMHG
BH CV ECHO MEAS - AO ROOT AREA (BSA CORRECTED): 1.4
BH CV ECHO MEAS - AO ROOT AREA: 11.9 CM^2
BH CV ECHO MEAS - AO ROOT DIAM: 3.9 CM
BH CV ECHO MEAS - AO V2 MAX: 108 CM/SEC
BH CV ECHO MEAS - AO V2 MEAN: 75.6 CM/SEC
BH CV ECHO MEAS - AO V2 VTI: 23.3 CM
BH CV ECHO MEAS - AVA(I,A): 4.3 CM^2
BH CV ECHO MEAS - AVA(I,D): 4.3 CM^2
BH CV ECHO MEAS - AVA(V,A): 4.4 CM^2
BH CV ECHO MEAS - AVA(V,D): 4.4 CM^2
BH CV ECHO MEAS - BSA(HAYCOCK): 2.8 M^2
BH CV ECHO MEAS - BSA: 2.7 M^2
BH CV ECHO MEAS - BZI_BMI: 36.6 KILOGRAMS/M^2
BH CV ECHO MEAS - BZI_METRIC_HEIGHT: 195.6 CM
BH CV ECHO MEAS - BZI_METRIC_WEIGHT: 140.2 KG
BH CV ECHO MEAS - EDV(CUBED): 214.9 ML
BH CV ECHO MEAS - EDV(MOD-SP4): 132 ML
BH CV ECHO MEAS - EDV(TEICH): 179.3 ML
BH CV ECHO MEAS - EF(CUBED): 65 %
BH CV ECHO MEAS - EF(MOD-SP4): 57.8 %
BH CV ECHO MEAS - EF(TEICH): 55.7 %
BH CV ECHO MEAS - ESV(CUBED): 75.2 ML
BH CV ECHO MEAS - ESV(MOD-SP4): 55.7 ML
BH CV ECHO MEAS - ESV(TEICH): 79.5 ML
BH CV ECHO MEAS - FS: 29.5 %
BH CV ECHO MEAS - IVS/LVPW: 1
BH CV ECHO MEAS - IVSD: 1.2 CM
BH CV ECHO MEAS - LA DIMENSION: 3.7 CM
BH CV ECHO MEAS - LA/AO: 0.95
BH CV ECHO MEAS - LAT PEAK E' VEL: 6.2 CM/SEC
BH CV ECHO MEAS - LV DIASTOLIC VOL/BSA (35-75): 49.1 ML/M^2
BH CV ECHO MEAS - LV MASS(C)D: 320.2 GRAMS
BH CV ECHO MEAS - LV MASS(C)DI: 119 GRAMS/M^2
BH CV ECHO MEAS - LV MAX PG: 4.4 MMHG
BH CV ECHO MEAS - LV MEAN PG: 3 MMHG
BH CV ECHO MEAS - LV SYSTOLIC VOL/BSA (12-30): 20.7 ML/M^2
BH CV ECHO MEAS - LV V1 MAX: 105 CM/SEC
BH CV ECHO MEAS - LV V1 MEAN: 76 CM/SEC
BH CV ECHO MEAS - LV V1 VTI: 22.4 CM
BH CV ECHO MEAS - LVIDD: 6 CM
BH CV ECHO MEAS - LVIDS: 4.2 CM
BH CV ECHO MEAS - LVLD AP4: 8.4 CM
BH CV ECHO MEAS - LVLS AP4: 7.4 CM
BH CV ECHO MEAS - LVOT AREA (M): 4.5 CM^2
BH CV ECHO MEAS - LVOT AREA: 4.5 CM^2
BH CV ECHO MEAS - LVOT DIAM: 2.4 CM
BH CV ECHO MEAS - LVPWD: 1.2 CM
BH CV ECHO MEAS - MED PEAK E' VEL: 6.31 CM/SEC
BH CV ECHO MEAS - MV A MAX VEL: 103 CM/SEC
BH CV ECHO MEAS - MV DEC TIME: 0.24 SEC
BH CV ECHO MEAS - MV E MAX VEL: 89.2 CM/SEC
BH CV ECHO MEAS - MV E/A: 0.87
BH CV ECHO MEAS - RAP SYSTOLE: 5 MMHG
BH CV ECHO MEAS - RVSP: 11.2 MMHG
BH CV ECHO MEAS - SI(AO): 103.4 ML/M^2
BH CV ECHO MEAS - SI(CUBED): 51.9 ML/M^2
BH CV ECHO MEAS - SI(LVOT): 37.7 ML/M^2
BH CV ECHO MEAS - SI(MOD-SP4): 28.4 ML/M^2
BH CV ECHO MEAS - SI(TEICH): 37.1 ML/M^2
BH CV ECHO MEAS - SV(AO): 278.3 ML
BH CV ECHO MEAS - SV(CUBED): 139.8 ML
BH CV ECHO MEAS - SV(LVOT): 101.3 ML
BH CV ECHO MEAS - SV(MOD-SP4): 76.3 ML
BH CV ECHO MEAS - SV(TEICH): 99.8 ML
BH CV ECHO MEAS - TR MAX VEL: 124 CM/SEC
BH CV ECHO MEASUREMENTS AVERAGE E/E' RATIO: 14.26
LEFT ATRIUM VOLUME INDEX: 25.4 ML/M2
LEFT ATRIUM VOLUME: 68.2 CM3

## 2021-11-22 PROBLEM — I47.1 PSVT (PAROXYSMAL SUPRAVENTRICULAR TACHYCARDIA) (HCC): Status: ACTIVE | Noted: 2019-09-17

## 2021-11-22 PROBLEM — I47.10 PSVT (PAROXYSMAL SUPRAVENTRICULAR TACHYCARDIA): Status: ACTIVE | Noted: 2019-09-17

## 2021-11-22 NOTE — PATIENT INSTRUCTIONS
Obesity, Adult  Obesity is the condition of having too much total body fat. Being overweight or obese means that your weight is greater than what is considered healthy for your body size. Obesity is determined by a measurement called BMI. BMI is an estimate of body fat and is calculated from height and weight. For adults, a BMI of 30 or higher is considered obese.  Obesity can lead to other health concerns and major illnesses, including:  · Stroke.  · Coronary artery disease (CAD).  · Type 2 diabetes.  · Some types of cancer, including cancers of the colon, breast, uterus, and gallbladder.  · Osteoarthritis.  · High blood pressure (hypertension).  · High cholesterol.  · Sleep apnea.  · Gallbladder stones.  · Infertility problems.  What are the causes?  Common causes of this condition include:  · Eating daily meals that are high in calories, sugar, and fat.  · Being born with genes that may make you more likely to become obese.  · Having a medical condition that causes obesity, including:  ? Hypothyroidism.  ? Polycystic ovarian syndrome (PCOS).  ? Binge-eating disorder.  ? Cushing syndrome.  · Taking certain medicines, such as steroids, antidepressants, and seizure medicines.  · Not being physically active (sedentary lifestyle).  · Not getting enough sleep.  · Drinking high amounts of sugar-sweetened beverages, such as soft drinks.  What increases the risk?  The following factors may make you more likely to develop this condition:  · Having a family history of obesity.  · Being a woman of  descent.  · Being a man of  descent.  · Living in an area with limited access to:  ? [place], recreation centers, or sidewalks.  ? Healthy food choices, such as grocery stores and farmers' markets.  What are the signs or symptoms?  The main sign of this condition is having too much body fat.  How is this diagnosed?  This condition is diagnosed based on:  · Your BMI. If you are an adult with a BMI of 30 or  higher, you are considered obese.  · Your waist circumference. This measures the distance around your waistline.  · Your skinfold thickness. Your health care provider may gently pinch a fold of your skin and measure it.  You may have other tests to check for underlying conditions.  How is this treated?  Treatment for this condition often includes changing your lifestyle. Treatment may include some or all of the following:  · Dietary changes. This may include developing a healthy meal plan.  · Regular physical activity. This may include activity that causes your heart to beat faster (aerobic exercise) and strength training. Work with your health care provider to design an exercise program that works for you.  · Medicine to help you lose weight if you are unable to lose 1 pound a week after 6 weeks of healthy eating and more physical activity.  · Treating conditions that cause the obesity (underlying conditions).  · Surgery. Surgical options may include gastric banding and gastric bypass. Surgery may be done if:  ? Other treatments have not helped to improve your condition.  ? You have a BMI of 40 or higher.  ? You have life-threatening health problems related to obesity.  Follow these instructions at home:  Eating and drinking    · Follow recommendations from your health care provider about what you eat and drink. Your health care provider may advise you to:  ? Limit fast food, sweets, and processed snack foods.  ? Choose low-fat options, such as low-fat milk instead of whole milk.  ? Eat 5 or more servings of fruits or vegetables every day.  ? Eat at home more often. This gives you more control over what you eat.  ? Choose healthy foods when you eat out.  ? Learn to read food labels. This will help you understand how much food is considered 1 serving.  ? Learn what a healthy serving size is.  ? Keep low-fat snacks available.  ? Limit sugary drinks, such as soda, fruit juice, sweetened iced tea, and flavored  milk.  · Drink enough water to keep your urine pale yellow.  · Do not follow a fad diet. Fad diets can be unhealthy and even dangerous.    Physical activity  · Exercise regularly, as told by your health care provider.  ? Most adults should get up to 150 minutes of moderate-intensity exercise every week.  ? Ask your health care provider what types of exercise are safe for you and how often you should exercise.  · Warm up and stretch before being active.  · Cool down and stretch after being active.  · Rest between periods of activity.  Lifestyle  · Work with your health care provider and a dietitian to set a weight-loss goal that is healthy and reasonable for you.  · Limit your screen time.  · Find ways to reward yourself that do not involve food.  · Do not drink alcohol if:  ? Your health care provider tells you not to drink.  ? You are pregnant, may be pregnant, or are planning to become pregnant.  · If you drink alcohol:  ? Limit how much you use to:  § 0-1 drink a day for women.  § 0-2 drinks a day for men.  ? Be aware of how much alcohol is in your drink. In the [place] one drink equals one 12 oz bottle of beer (355 mL), one 5 oz glass of wine (148 mL), or one 1½ oz glass of hard liquor (44 mL).  General instructions  · Keep a weight-loss journal to keep track of the food you eat and how much exercise you get.  · Take over-the-counter and prescription medicines only as told by your health care provider.  · Take vitamins and supplements only as told by your health care provider.  · Consider joining a support group. Your health care provider may be able to recommend a support group.  · Keep all follow-up visits as told by your health care provider. This is important.  Contact a health care provider if:  · You are unable to meet your weight loss goal after 6 weeks of dietary and lifestyle changes.  Get help right away if you are having:  · Trouble breathing.  · Suicidal thoughts or behaviors.  Summary  · Obesity is  the condition of having too much total body fat.  · Being overweight or obese means that your weight is greater than what is considered healthy for your body size.  · Work with your health care provider and a dietitian to set a weight-loss goal that is healthy and reasonable for you.  · Exercise regularly, as told by your health care provider. Ask your health care provider what types of exercise are safe for you and how often you should exercise.  This information is not intended to replace advice given to you by your health care provider. Make sure you discuss any questions you have with your health care provider.  Document Revised: 08/22/2019 Document Reviewed: 08/22/2019  Elsevier Patient Education © 2021 [employer]

## 2021-11-22 NOTE — PROGRESS NOTES
"Chief Complaint  Palpitations (Increased Verapamil LOV. States has felt better in last few months than he has in a long time. Only 2 occ with short fast HR's) and Results (Echo)    Subjective          Vignesh Alvarez presents to [facility] CARDIOLOGY for routine follow-up of outpatient testing and medication adjustments.  Verapamil was increased to 240 mg in the morning and 120 mg in the evening at his last office visit on 7/12/2021.  2D echo on 8/30/2021 revealed mildly dilated left ventricular cavity, mild concentric hypertrophy, normal left ventricular systolic function with ejection fraction 56 to 60%, indeterminate left ventricular diastolic function and no significant valvular heart disease.  He has paroxysmal supraventricular tachycardia, hypertension, obstructive sleep apnea and obesity.  Patient denies chest pain, shortness of breath, palpitations, dizziness, syncope, orthopnea, PND, edema or decreased stamina.  Patient denies any signs of bleeding.    Hypertension  This is a chronic problem. The current episode started more than 1 year ago. The problem is controlled. Pertinent negatives include no anxiety, blurred vision, chest pain, headaches, malaise/fatigue, neck pain, orthopnea, palpitations, peripheral edema, PND, shortness of breath or sweats. Risk factors for coronary artery disease include male gender, obesity and dyslipidemia. Current antihypertension treatment includes beta blockers, diuretics, ACE inhibitors and calcium channel blockers. The current treatment provides significant improvement.       Objective   Vital Signs:   /80   Pulse 62   Ht 195.6 cm (77\")   Wt (!) 141 kg (310 lb)   SpO2 99%   BMI 36.76 kg/m²     Vitals and nursing note reviewed.   Constitutional:       General: Awake.      Appearance: Normal and healthy appearance. Well-developed and not in distress. Obese.   Eyes:      General: Lids are normal.      Conjunctiva/sclera: Conjunctivae normal.      " Pupils: Pupils are equal, round, and reactive to light.   HENT:      Head: Normocephalic and atraumatic.      Nose: Nose normal.   Neck:      Vascular: No JVR. JVD normal.   Pulmonary:      Effort: Pulmonary effort is normal.      Breath sounds: Normal breath sounds. No wheezing. No rhonchi. No rales.   Chest:      Chest wall: Not tender to palpatation.   Cardiovascular:      PMI at left midclavicular line. Normal rate. Regular rhythm. Normal S1. Normal S2.      Murmurs: There is no murmur.      No gallop. No click. No rub.   Pulses:     Intact distal pulses.   Edema:     Peripheral edema absent.   Abdominal:      General: Bowel sounds are normal.      Palpations: Abdomen is soft.      Tenderness: There is no abdominal tenderness.   Musculoskeletal: Normal range of motion.         General: No tenderness.      Cervical back: Normal range of motion. Skin:     General: Skin is warm and dry.   Neurological:      General: No focal deficit present.      Mental Status: Alert, oriented to person, place, and time and oriented to person, place and time.   Psychiatric:         Attention and Perception: Attention and perception normal.         Mood and Affect: Mood and affect normal.         Speech: Speech normal.         Behavior: Behavior normal. Behavior is cooperative.         Thought Content: Thought content normal.         Cognition and Memory: Cognition and memory normal.         Judgment: Judgment normal.        Result Review :   The following data was reviewed by: SID Ramos on 07/12/2021:      Data reviewed: Cardiology studies holter monitor 6/7/21     ECG 12 Lead    Date/Time: 11/23/2021 9:43 AM  Performed by: Bev Lucio APRN  Authorized by: Bev Lucio APRN   Comparison: compared with previous ECG from 11/23/2021  Similar to previous ECG  Rhythm: sinus rhythm  Rate: normal  BPM: 62  Conduction: incomplete left bundle branch block  T inversion: III    Clinical impression: normal  ECG              Assessment and Plan    Diagnoses and all orders for this visit:    1.  Primary hypertension (Primary)-blood pressure well controlled.  Continue Benicar, HCTZ and metoprolol tartrate.  Monitor and record daily blood pressure. Report readings consistently higher than 140/90 or consistently lower than 100/60.     2. Obstructive sleep apnea syndrome-patient reports compliance with CPAP.    3. Class 2 severe obesity due to excess calories with serious comorbidity and body mass index (BMI) of 36.0 to 36.9 in adult ([facility])- Patient's Body mass index is 36.76 kg/m². indicating that he is obese (BMI >30). Obesity-related health conditions include the following: obstructive sleep apnea and hypertension. Obesity is unchanged. BMI is is above average; no BMI management plan is appropriate. We discussed low calorie, low carb based diet program, portion control and increasing exercise.    4. PSVT (paroxysmal supraventricular tachycardia) ([facility])-stable.  Continue verapamil.      Follow Up   Return in about 6 months (around 5/23/2022) for Next scheduled follow up.  Patient was given instructions and counseling regarding his condition or for health maintenance advice. Please see specific information pulled into the AVS if appropriate.

## 2021-11-23 ENCOUNTER — OFFICE VISIT (OUTPATIENT)
Dept: CARDIOLOGY | Facility: CLINIC | Age: 56
End: 2021-11-23

## 2021-11-23 VITALS
BODY MASS INDEX: 36.6 KG/M2 | WEIGHT: 310 LBS | DIASTOLIC BLOOD PRESSURE: 80 MMHG | OXYGEN SATURATION: 99 % | SYSTOLIC BLOOD PRESSURE: 118 MMHG | HEIGHT: 77 IN | HEART RATE: 62 BPM

## 2021-11-23 DIAGNOSIS — E66.01 CLASS 2 SEVERE OBESITY DUE TO EXCESS CALORIES WITH SERIOUS COMORBIDITY AND BODY MASS INDEX (BMI) OF 36.0 TO 36.9 IN ADULT (HCC): ICD-10-CM

## 2021-11-23 DIAGNOSIS — I47.1 PSVT (PAROXYSMAL SUPRAVENTRICULAR TACHYCARDIA) (HCC): ICD-10-CM

## 2021-11-23 DIAGNOSIS — I10 PRIMARY HYPERTENSION: Primary | Chronic | ICD-10-CM

## 2021-11-23 DIAGNOSIS — G47.33 OBSTRUCTIVE SLEEP APNEA SYNDROME: Chronic | ICD-10-CM

## 2021-11-23 PROCEDURE — 99214 OFFICE O/P EST MOD 30 MIN: CPT | Performed by: NURSE PRACTITIONER

## 2021-11-23 PROCEDURE — 93000 ELECTROCARDIOGRAM COMPLETE: CPT | Performed by: NURSE PRACTITIONER

## 2022-05-23 NOTE — PROGRESS NOTES
"Chief Complaint  Palpitations (6mo F/U)    Subjective          Vignesh Alvarez presents to [facility] CARDIOLOGY for routine follow-up. He has paroxysmal supraventricular tachycardia, hypertension, obstructive sleep apnea and obesity. He continues to report palpitations, however he states that these episodes are much less frequent than in the past. Patient denies chest pain, shortness of breath, dizziness, syncope, orthopnea, PND, edema or decreased stamina.  Patient denies any signs of bleeding.    Hypertension  This is a chronic problem. The current episode started more than 1 year ago. The problem is controlled. Associated symptoms include palpitations. Pertinent negatives include no anxiety, blurred vision, chest pain, headaches, malaise/fatigue, neck pain, orthopnea, peripheral edema, PND, shortness of breath or sweats. Risk factors for coronary artery disease include male gender, obesity and dyslipidemia. Current antihypertension treatment includes beta blockers, diuretics, ACE inhibitors and calcium channel blockers. The current treatment provides significant improvement.     I have reviewed and confirmed the accuracy of the ROS  SID Ramos        Objective   Vital Signs:   /86   Pulse 59   Ht 195.6 cm (77\")   Wt (!) 145 kg (319 lb)   SpO2 98%   BMI 37.83 kg/m²     Vitals and nursing note reviewed.   Constitutional:       General: Awake.      Appearance: Normal and healthy appearance. Well-developed and not in distress. Obese.   Eyes:      General: Lids are normal.      Conjunctiva/sclera: Conjunctivae normal.      Pupils: Pupils are equal, round, and reactive to light.   HENT:      Head: Normocephalic and atraumatic.      Nose: Nose normal.   Neck:      Vascular: No JVR. JVD normal.   Pulmonary:      Effort: Pulmonary effort is normal.      Breath sounds: Normal breath sounds. No wheezing. No rhonchi. No rales.   Chest:      Chest wall: Not tender to palpatation. "   Cardiovascular:      PMI at left midclavicular line. Normal rate. Regular rhythm. Normal S1. Normal S2.      Murmurs: There is no murmur.      No gallop. No click. No rub.   Pulses:     Intact distal pulses.   Edema:     Peripheral edema absent.   Abdominal:      General: Bowel sounds are normal.      Palpations: Abdomen is soft.      Tenderness: There is no abdominal tenderness.   Musculoskeletal: Normal range of motion.         General: No tenderness.      Cervical back: Normal range of motion. Skin:     General: Skin is warm and dry.   Neurological:      General: No focal deficit present.      Mental Status: Alert, oriented to person, place, and time and oriented to person, place and time.   Psychiatric:         Attention and Perception: Attention and perception normal.         Mood and Affect: Mood and affect normal.         Speech: Speech normal.         Behavior: Behavior normal. Behavior is cooperative.         Thought Content: Thought content normal.         Cognition and Memory: Cognition and memory normal.         Judgment: Judgment normal.        Result Review :   The following data was reviewed by: SID Ramos on 5/24/2022:      Data reviewed: Cardiology studies holter monitor 6/7/21     ECG 12 Lead    Date/Time: 5/24/2022 9:58 AM  Performed by: Bev Lucio APRN  Authorized by: Bev Lucio APRN   Comparison: compared with previous ECG from 11/23/2021  Rhythm: sinus bradycardia  Rate: bradycardic  BPM: 59  Conduction: 1st degree AV block and non-specific intraventricular conduction delay  QRS axis: normal    Clinical impression: abnormal EKG              Assessment and Plan    Diagnoses and all orders for this visit:    1.  Primary hypertension (Primary)-blood pressure is mildly elevated in office today. Pt reports readings consistently around 130 systolic at home. He states that he is trying to cut back on sodium intake and is attempting to lose weight. Continue Benicar,  HCTZ, verapamil and metoprolol tartrate for now.  Monitor and record daily blood pressure. Report readings consistently higher than 130/90 or consistently lower than 100/60.  Stable.    2. Obstructive sleep apnea syndrome-patient reports compliance with CPAP.  Stable.    3. Class 2 severe obesity due to excess calories with serious comorbidity and body mass index (BMI) of 37.0 to 37.9 in adult ([facility])- Patient's Body mass index is 37.83 kg/m². indicating that he is obese (BMI >30). Obesity-related health conditions include the following: obstructive sleep apnea and hypertension. Obesity is unchanged. BMI is is above average; no BMI management plan is appropriate. We discussed low calorie, low carb based diet program, portion control and increasing exercise.    4. PSVT (paroxysmal supraventricular tachycardia) ([facility])-stable.  Continue verapamil.      Follow Up   Return in about 6 months (around 11/24/2022) for Next scheduled follow up with Dr. Quinoenz.  Patient was given instructions and counseling regarding his condition or for health maintenance advice. Please see specific information pulled into the AVS if appropriate.

## 2022-05-24 ENCOUNTER — OFFICE VISIT (OUTPATIENT)
Dept: CARDIOLOGY | Facility: CLINIC | Age: 57
End: 2022-05-24

## 2022-05-24 VITALS
HEART RATE: 59 BPM | OXYGEN SATURATION: 98 % | WEIGHT: 315 LBS | HEIGHT: 77 IN | SYSTOLIC BLOOD PRESSURE: 138 MMHG | DIASTOLIC BLOOD PRESSURE: 86 MMHG | BODY MASS INDEX: 37.19 KG/M2

## 2022-05-24 DIAGNOSIS — E66.01 CLASS 2 SEVERE OBESITY DUE TO EXCESS CALORIES WITH SERIOUS COMORBIDITY AND BODY MASS INDEX (BMI) OF 37.0 TO 37.9 IN ADULT: ICD-10-CM

## 2022-05-24 DIAGNOSIS — G47.33 OBSTRUCTIVE SLEEP APNEA SYNDROME: Chronic | ICD-10-CM

## 2022-05-24 DIAGNOSIS — I47.1 PSVT (PAROXYSMAL SUPRAVENTRICULAR TACHYCARDIA): ICD-10-CM

## 2022-05-24 DIAGNOSIS — I10 PRIMARY HYPERTENSION: Primary | Chronic | ICD-10-CM

## 2022-05-24 PROCEDURE — 99214 OFFICE O/P EST MOD 30 MIN: CPT | Performed by: NURSE PRACTITIONER

## 2022-05-24 PROCEDURE — 93000 ELECTROCARDIOGRAM COMPLETE: CPT | Performed by: NURSE PRACTITIONER

## 2022-06-16 RX ORDER — VERAPAMIL HYDROCHLORIDE 240 MG/1
TABLET, FILM COATED, EXTENDED RELEASE ORAL
Qty: 90 TABLET | Refills: 3 | OUTPATIENT
Start: 2022-06-16

## 2022-11-29 ENCOUNTER — OFFICE VISIT (OUTPATIENT)
Dept: CARDIOLOGY | Facility: CLINIC | Age: 57
End: 2022-11-29

## 2022-11-29 VITALS
HEART RATE: 68 BPM | HEIGHT: 77 IN | BODY MASS INDEX: 37.19 KG/M2 | WEIGHT: 315 LBS | DIASTOLIC BLOOD PRESSURE: 79 MMHG | SYSTOLIC BLOOD PRESSURE: 131 MMHG

## 2022-11-29 DIAGNOSIS — R06.09 DOE (DYSPNEA ON EXERTION): ICD-10-CM

## 2022-11-29 DIAGNOSIS — I49.3 PVC (PREMATURE VENTRICULAR CONTRACTION): ICD-10-CM

## 2022-11-29 DIAGNOSIS — R00.2 PALPITATIONS: ICD-10-CM

## 2022-11-29 DIAGNOSIS — I10 PRIMARY HYPERTENSION: Chronic | ICD-10-CM

## 2022-11-29 DIAGNOSIS — G47.33 OBSTRUCTIVE SLEEP APNEA SYNDROME: Chronic | ICD-10-CM

## 2022-11-29 DIAGNOSIS — I47.1 PSVT (PAROXYSMAL SUPRAVENTRICULAR TACHYCARDIA): Primary | ICD-10-CM

## 2022-11-29 PROBLEM — E66.01 CLASS 2 SEVERE OBESITY DUE TO EXCESS CALORIES WITH SERIOUS COMORBIDITY AND BODY MASS INDEX (BMI) OF 37.0 TO 37.9 IN ADULT: Status: RESOLVED | Noted: 2019-11-25 | Resolved: 2022-11-29

## 2022-11-29 PROBLEM — E66.812 CLASS 2 SEVERE OBESITY DUE TO EXCESS CALORIES WITH SERIOUS COMORBIDITY AND BODY MASS INDEX (BMI) OF 37.0 TO 37.9 IN ADULT: Status: RESOLVED | Noted: 2019-11-25 | Resolved: 2022-11-29

## 2022-11-29 PROCEDURE — 99214 OFFICE O/P EST MOD 30 MIN: CPT | Performed by: INTERNAL MEDICINE

## 2022-11-29 PROCEDURE — 93000 ELECTROCARDIOGRAM COMPLETE: CPT | Performed by: INTERNAL MEDICINE

## 2022-11-29 NOTE — PROGRESS NOTES
Vignesh Alvarez  [id number]  1965  57 y.o.  male         Referring Provider: Sergio Delarosa MD    Reason for  Visit: Routine visit   prior visit for shortness of breath and palpitations  Chest pressure now resolved     Diagnosed with obstructive sleep apnea now CPAP since September 2019  Now with nocturnal palpitations   Cardiac workup test results as below:14-day outpatient cardiac telemetry     Subjective      Intermittent dizziness  Rare palpitations   once every several days to several weeks lasting for less than 1 minute  No associated symptoms of dizziness, weakness, chest pain,  shortness of breath   Overall palpitations better on higher dose of beta blocker therapy     Tolerating current medications well with no untoward side effects   Compliant with prescribed medication regimen. Tries to adhere to cardiac diet.     Mild chronic exertional shortness of breath on exertion relieved with rest  No significant cough or wheezing  No associated chest pain  No significant pedal edema    No fever or chills  No significant expectoration    No hemoptysis  No presyncope or syncope    Tolerating current medications well with no untoward side effects   Compliant with prescribed medication regimen. Tries to adhere to cardiac diet.     BP well controlled at home.     Joint pain in small, medium and large joints   Under lot of mental stress due to family issues     obstructive sleep apnea on CPAP   No bleeding, excessive bruising, gait instability or fall risks          History of present illness:  Vignesh Alvarez is a 57 y.o. yo male with essential hypertension who presents today for   No chief complaint on file.  .    History  Past Medical History:   Diagnosis Date   • Anemia    • Fragoso's esophagus      Dr Gertrude [place]    • Dyspnea on exertion    • GERD (gastroesophageal reflux disease)    • Hypertension    • Joint pain    • Sleep apnea     uses cpap   • Umbilical hernia 03/2020   ,   Past Surgical  History:   Procedure Laterality Date   • REPLACEMENT TOTAL KNEE      RIGHT - 12/2018   • TONSILLECTOMY  1969   ,   Family History   Problem Relation Age of Onset   • Hypertension Mother    • Diabetes Mother    • Heart failure Father    • Hypertension Father    • Arthritis Father    • Heart disease Father    ,   Social History     Tobacco Use   • Smoking status: Never   • Smokeless tobacco: Never   Vaping Use   • Vaping Use: Never used   Substance Use Topics   • Alcohol use: Yes     Alcohol/week: 1.0 standard drink     Types: 1 Cans of beer per week     Comment: OCC   • Drug use: No   ,     Medications  Current Outpatient Medications   Medication Sig Dispense Refill   • aspirin 81 MG tablet Take 81 mg by mouth Daily.     • atorvastatin (LIPITOR) 10 MG tablet Take 10 mg by mouth Daily.     • metFORMIN (GLUCOPHAGE) 1000 MG tablet Take 1,000 mg by mouth 2 (Two) Times a Day.     • metoprolol tartrate (LOPRESSOR) 50 MG tablet Take 1 tablet by mouth 2 (Two) Times a Day. 180 tablet 3   • olmesartan-hydrochlorothiazide (BENICAR HCT) 40-25 MG per tablet Take 1 tablet by mouth Daily.     • OMEPRAZOLE PO Take 20 mg by mouth Daily.     • Simethicone (GAS-X EXTRA STRENGTH PO) Take  by mouth Every Night.     • Testosterone Cypionate (DEPOTESTOTERONE CYPIONATE) 200 MG/ML injection INJECT 1.5MLS EVERY TWO WEEKS  2   • verapamil SR (CALAN-SR) 120 MG CR tablet Take 1 tablet by mouth Every Night. Take Calan  mg AM and Calan  mg PM 90 tablet 3   • verapamil SR (CALAN-SR) 240 MG CR tablet Take 1 tablet by mouth Daily. 90 tablet 3     No current facility-administered medications for this visit.       Allergies:  Patient has no known allergies.    Review of Systems  Review of Systems   Constitutional: Positive for malaise/fatigue and weight loss.   HENT: Negative.    Eyes: Negative.    Cardiovascular: Positive for dyspnea on exertion and palpitations. Negative for chest pain, claudication, cyanosis, irregular heartbeat, leg  "swelling, near-syncope, orthopnea, paroxysmal nocturnal dyspnea and syncope.   Respiratory: Negative.    Endocrine: Negative.    Hematologic/Lymphatic: Negative.    Skin: Negative.    Musculoskeletal: Positive for arthritis.   Gastrointestinal: Negative for anorexia.   Genitourinary: Negative.    Neurological: Positive for weakness.   Psychiatric/Behavioral: Negative.        Objective     Physical Exam:  /79   Pulse 68   Ht 195.6 cm (77\")   Wt (!) 147 kg (323 lb)   BMI 38.30 kg/m²        Physical Exam  Constitutional:       Appearance: He is well-developed.   HENT:      Head: Normocephalic.   Neck:      Vascular: Normal carotid pulses. No carotid bruit or JVD.      Trachea: No tracheal tenderness or tracheal deviation.   Cardiovascular:      Rate and Rhythm: Regular rhythm.      Pulses: Normal pulses.      Heart sounds: Normal heart sounds.   Pulmonary:      Effort: Pulmonary effort is normal.      Breath sounds: No stridor.   Abdominal:      General: There is no distension.      Palpations: Abdomen is soft.      Tenderness: There is no abdominal tenderness.   Musculoskeletal:      Cervical back: No edema.   Skin:     General: Skin is warm.   Neurological:      Mental Status: He is alert.      Cranial Nerves: No cranial nerve deficit.      Sensory: No sensory deficit.   Psychiatric:         Speech: Speech normal.         Behavior: Behavior normal.             Results Review:    Results for orders placed during the hospital encounter of 08/30/21    Adult Transthoracic Echo Complete w/ Color, Spectral and Contrast if necessary per protocol    Interpretation Summary  · The left ventricular cavity is mildly dilated.  · Left ventricular wall thickness is consistent with mild concentric hypertrophy.  · Left ventricular ejection fraction appears to be 56 - 60%. Left ventricular systolic function is normal.  · Left ventricular diastolic function was indeterminate.  · Estimated right ventricular systolic pressure " from tricuspid regurgitation is normal (<35 mmHg).       Vignesh Alvarez   Holter Monitor - 24 Hour - Record/Scan Analysis With Report/Interp ([id number]) Clinic   Order# [id number]   Reading physician: Abdirahman Quinonez MD Ordering physician: Abdirahman Quinonez MD Study date: 20   Patient Information     Patient Name  Vignesh Alvarez MRN  [id number] Sex  Male  (Age)  1965 (55 y.o.)   Interpretation Summary        · Average HR: 71. Min HR: 54. Max HR: 116.     · Entire report was reviewed.  Monitoring in days: ~1  · The predominant rhythm noted during the testing period was sinus rhythm.     · Rare supraventricular ectopics with an APC burden of: < 1%  · No significant supraventricular  tachy or kassandra arrhythmia.         · Rare premature ventricular contractions with a PVC burden of: < 1%  · No significant  ventricular tachy or kassandra arrhythmia.     · No correlated arrhythmia  · No significant pauses           Conclusion: Baseline rhythm is sinus.  No significant ectopy or arrhythmia.  Benign findings.            Results for orders placed during the hospital encounter of 19   Adult Stress Echo W/ Cont or Stress Agent if Necessary Per Protocol    Narrative · Estimated EF = 55%.  · Left ventricular systolic function is normal.  · Low risk stress test for stress induced myocardial ischemia       Interpretation Summary     · Estimated EF = 55%.  · Left ventricular wall thickness is consistent with moderate concentric hypertrophy.  · Left ventricular diastolic dysfunction.  · Left atrial cavity size is mildly dilated.  · No evidence of pulmonary hypertension is present.        Reading physician: Abdirahman Quinonez MD Ordering physician: Abdirahman Quinonez MD Study date: 19   Patient Information     Patient Name  Vignesh Alvarez MRN  [id number] Sex  Male  (Age)  1965 (54 y.o.)   Interpretation Summary        · Average HR: 90. Min HR: 60. Max HR: 139.     · Monitored for ~1 day   · The predominant rhythm noted during  the testing period was sinus rhythm.  · [id number]  premature ventricular contractions: PVC burden:  14.5 %   · 12 atrial premature contractions:  APC burden: <0.1%             · No significant supraventricular  tachy or kassandra arrhythmia.  · 9 runs of nonsustained ventricular tachycardia longest 4 beats at a maximum rate of 177 bpm.  · No patient diary available  · No significant pauses above 3 seconds     Conclusion: Baseline rhythm is sinus.  Frequent premature ventricular contractions with a total burden of 14.5%.  9 brief runs of nonsustained ventricular tachycardia.            ECG 12 Lead    Date/Time: 11/29/2022 8:51 AM  Performed by: Abdirahman Quinonez MD  Authorized by: Abdirahman Quinonez MD   Comparison: compared with previous ECG from 5/24/2022  Comparison to previous ECG: Ventricular rate changed from 59  to 62  beats per minute    Rhythm: sinus rhythm  Rate: normal  Conduction: conduction normal  ST Segments: ST segments normal  T Waves: T waves normal  QRS axis: normal  Other: no other findings    Clinical impression: normal ECG            Assessment & Plan   Diagnoses and all orders for this visit:    1. PSVT (paroxysmal supraventricular tachycardia) (HCC) (Primary)    2. PVC (premature ventricular contraction)    3. Obstructive sleep apnea syndrome    4. MOON (dyspnea on exertion)    5. Primary hypertension    6. Palpitations    7. BMI 38.0-38.9,adult    Other orders  -     ECG 12 Lead      Plan      Overall doing well no new cardiovascular symptoms and therefore no additional cardiac testing is required prior to next visit  If any interim issues arise will call me for further evaluation.     Patient expressed understanding  Encouraged and answered all questions   Discussed with the patient and all questioned fully answered. He will call me if any problems arise.   Discussed results of prior testing with patient : echo and outpatient cardiac telemetry   as well electrocardiogram from today       Patient was advised  to continue CPAP daily.   Keep A1c less than 7 Primary to monitor  Keep LDL below 70 mg/dl. Monitor liver and renal functions.   Monitor CBC, CMP, TSH (as indicated) and Lipid Panel by primary       Check BP and heart rates twice daily at least 3x / week, week a month  at home and bring a recording for me to review next visit  If BP >130/85 or < 100/60 persistently over 3 reading 30 mins apart call sooner      Monitor for any signs of bleeding including red or dark stools as well as easy bruisabilty. Fall precautions.       I support the patient's decision to take the Covid -19 vaccine   Had required complete course   No major issues   Now fully immunized    Had booster too      Follow up with Bev LAU              Return in about 6 months (around 5/29/2023).

## 2023-02-27 NOTE — PROGRESS NOTES
Patient Care Team:  Sergio Delarosa MD as PCP - General (Family Medicine)  Sergio Delarosa MD as Referring Physician (Family Medicine)  Abdirahman Quinonez MD as Cardiologist (Cardiology)    Reason for Visit:  Medical Weight Loss    Subjective        Vignesh Alvarez is a 55 y.o. year old male who is here for follow-up and continued medical management of morbid obesity. His current Body mass index is 37.93 kg/m². He states he suffers from high blood pressure, sleep apnea, reflux, and morbid obesity due to weight gain. He is currently following the 4 meals/day diet prescription. Vignesh Alvarez previously agreed to incorporate the prescription as provided, while also increasing physical exercise. Patient states he has been successful at eating 3-4 meals/day, limiting carbohydrates after lunch, and getting adequate water and protein intake. He has been not exercising. Vignesh Alvarez also states he has been drinking 67 ounces of water and getting 60 grams of protein intake per day. He has lost 4 lbs of weight since his last visit.    Review of Systems  Negative except the below listed  Musculoskeletal ROS: positive for - joint pain    History  Past Medical History:   Diagnosis Date   • Anemia    • Fragoso's esophagus      Dr Gertrude Woods [place]    • Dyspnea on exertion    • GERD (gastroesophageal reflux disease)    • Hypertension    • Joint pain    • Sleep apnea     uses cpap     Past Surgical History:   Procedure Laterality Date   • REPLACEMENT TOTAL KNEE      RIGHT - 12/2018   • TONSILLECTOMY  1969     Family History   Problem Relation Age of Onset   • Hypertension Mother    • Diabetes Mother    • Heart failure Father    • Hypertension Father    • Arthritis Father    • Heart disease Father      Social History     Tobacco Use   • Smoking status: Never Smoker   • Smokeless tobacco: Never Used   Substance Use Topics   • Alcohol use: Yes     Comment: OCC   • Drug use: No       (Not in a hospital  admission)  Allergies:  Patient has no known allergies.      Current Outpatient Medications:   •  aspirin 81 MG tablet, Take 81 mg by mouth Daily., Disp: , Rfl:   •  hydrochlorothiazide (HYDRODIURIL) 25 MG tablet, Take 25 mg by mouth Daily., Disp: , Rfl:   •  linaclotide (LINZESS) 72 MCG capsule capsule, Take 72 mcg by mouth Daily., Disp: , Rfl:   •  metFORMIN (GLUCOPHAGE) 1000 MG tablet, Take 1,000 mg by mouth 2 (Two) Times a Day., Disp: , Rfl:   •  metoprolol tartrate (LOPRESSOR) 25 MG tablet, Take 1 tablet by mouth 2 (Two) Times a Day., Disp: 180 tablet, Rfl: 3  •  OMEPRAZOLE PO, Take 20 mg by mouth Daily., Disp: , Rfl:   •  Testosterone Cypionate (DEPOTESTOTERONE CYPIONATE) 200 MG/ML injection, INJECT 1.5MLS EVERY TWO WEEKS, Disp: , Rfl: 2  •  trandolapril-verapamil (TARKA) 4-240 MG per CR tablet, Take 1 tablet by mouth Daily., Disp: , Rfl: 1    Objective     Vital Signs  Temp:  [98 °F (36.7 °C)] 98 °F (36.7 °C)  Heart Rate:  [70] 70  BP: (156)/(94) 156/94  Body mass index is 37.93 kg/m².      01/20/20  0950   Weight: (!) 141 kg (311 lb 9.6 oz)       Physical Exam:  HEENT: extra ocular movement intact  Respiratory:appears well, vitals normal, no respiratory distress, acyanotic, normal RR, chest clear, no wheezing, crepitations, rhonchi, normal symmetric air entry  Cardiovascular: Regular rate and rhythm, S1, S2 normal, no murmur, click, rub or gallop  GI: Soft, non-tender, normal bowel sounds; no bruits, organomegaly or masses. Abnormal shape: Obese  Musculoskeletal: inspection - no abnormality, range of motion normal  Neurologic: alert, oriented, normal speech, no focal findings or movement disorder noted       Results Review:   None        Assessment/Plan   Encounter Diagnoses   Name Primary?   • Class 2 severe obesity due to excess calories with serious comorbidity and body mass index (BMI) of 37.0 to 37.9 in adult (CMS/[facility]) Yes   • Essential hypertension    • Obstructive sleep apnea syndrome          1.  Vignesh Alvarez was seen today for follow-up, obesity, nutrition counseling and weight loss. He has lost 4 lbs of weight since his last visit, making his Body mass index is 37.93 kg/m².. Today we discussed consistency with healthy changes in lifestyle, diet, and exercise for long term success. Vignesh Alvarez had received handouts to him explaining the recommendation on portion sizes/appetite control/reading nutrition labels. Intensive behavioral therapy for obesity was done today as well.    Goals for this month are: continue to follow the meal prescription as provided focusing on eating 4 meals/day with vegetables at every meal, eliminating carbohydrates after lunch, and getting adequate water and protein intake. Patient encouraged to call with questions and/or struggles as they may arise prior to next scheduled appointment.    2. Current comorbid conditions of hypertension and MOUNIKA associated with his morbid obesity are reported to be stable on his current treatment regimen and medications. We anticipate the comorbid conditions to improve as we address his morbid obesity.    Follow up in 1 month for a weight recheck.    SID Gavin    01/20/20  12:26 PM  Patient Care Team:  Sergio Delarosa MD as PCP - General (Family Medicine)  Sergio Delarosa MD as Referring Physician (Family Medicine)  Abdirahman Quinonez MD as Cardiologist (Cardiology)     done

## 2023-07-24 ENCOUNTER — OFFICE VISIT (OUTPATIENT)
Dept: CARDIOLOGY | Facility: CLINIC | Age: 58
End: 2023-07-24
Payer: COMMERCIAL

## 2023-07-24 VITALS
OXYGEN SATURATION: 98 % | BODY MASS INDEX: 37.19 KG/M2 | DIASTOLIC BLOOD PRESSURE: 83 MMHG | SYSTOLIC BLOOD PRESSURE: 131 MMHG | HEIGHT: 77 IN | WEIGHT: 315 LBS | HEART RATE: 72 BPM

## 2023-07-24 DIAGNOSIS — G47.33 OSA ON CPAP: ICD-10-CM

## 2023-07-24 DIAGNOSIS — I49.3 PVC (PREMATURE VENTRICULAR CONTRACTION): ICD-10-CM

## 2023-07-24 DIAGNOSIS — Z99.89 OSA ON CPAP: ICD-10-CM

## 2023-07-24 DIAGNOSIS — I47.1 PSVT (PAROXYSMAL SUPRAVENTRICULAR TACHYCARDIA): ICD-10-CM

## 2023-07-24 DIAGNOSIS — E66.01 CLASS 2 SEVERE OBESITY DUE TO EXCESS CALORIES WITH SERIOUS COMORBIDITY AND BODY MASS INDEX (BMI) OF 39.0 TO 39.9 IN ADULT: ICD-10-CM

## 2023-07-24 DIAGNOSIS — I10 PRIMARY HYPERTENSION: Primary | Chronic | ICD-10-CM

## 2023-07-24 PROCEDURE — 93000 ELECTROCARDIOGRAM COMPLETE: CPT | Performed by: NURSE PRACTITIONER

## 2023-07-24 PROCEDURE — 99214 OFFICE O/P EST MOD 30 MIN: CPT | Performed by: NURSE PRACTITIONER

## 2023-07-24 NOTE — PROGRESS NOTES
"Chief Complaint  Irregular Heart Beat (8 month follow up. Patient says he felt his heart beating hard last night. )    Subjective          Vignesh Alvarez presents to [facility] for routine follow-up. He has paroxysmal supraventricular tachycardia, premature ventricular contractions, hypertension, obstructive sleep apnea and obesity. He continues to report occasional palpitations. Patient denies chest pain, shortness of breath, dizziness, syncope, orthopnea, PND, edema or decreased stamina.  Patient denies any signs of bleeding.    Hypertension  This is a chronic problem. The current episode started more than 1 year ago. The problem is controlled. Associated symptoms include palpitations. Pertinent negatives include no anxiety, blurred vision, chest pain, headaches, malaise/fatigue, neck pain, orthopnea, peripheral edema, PND, shortness of breath or sweats. Risk factors for coronary artery disease include male gender, obesity and dyslipidemia. Current antihypertension treatment includes beta blockers, diuretics, ACE inhibitors and calcium channel blockers. The current treatment provides significant improvement.     I have reviewed and confirmed the accuracy of the ROS SID Ramos    Objective   Vital Signs:   /83   Pulse 72   Ht 195.6 cm (77\")   Wt (!) 153 kg (337 lb)   SpO2 98%   BMI 39.96 kg/m²     Vitals and nursing note reviewed.   Constitutional:       General: Awake.      Appearance: Normal and healthy appearance. Well-developed and not in distress. Obese.   Eyes:      General: Lids are normal.      Conjunctiva/sclera: Conjunctivae normal.      Pupils: Pupils are equal, round, and reactive to light.   HENT:      Head: Normocephalic and atraumatic.      Nose: Nose normal.   Neck:      Vascular: No JVR. JVD normal.   Pulmonary:      Effort: Pulmonary effort is normal.      Breath sounds: Normal breath sounds. No wheezing. No rhonchi. No rales.   Chest:      " Chest wall: Not tender to palpatation.   Cardiovascular:      PMI at left midclavicular line. Normal rate. Regular rhythm. Normal S1. Normal S2.       Murmurs: There is no murmur.      No gallop.  No click. No rub.   Pulses:     Intact distal pulses.   Edema:     Peripheral edema absent.   Abdominal:      General: Bowel sounds are normal.      Palpations: Abdomen is soft.      Tenderness: There is no abdominal tenderness.   Musculoskeletal: Normal range of motion.         General: No tenderness.      Cervical back: Normal range of motion. Skin:     General: Skin is warm and dry.   Neurological:      General: No focal deficit present.      Mental Status: Alert, oriented to person, place, and time and oriented to person, place and time.   Psychiatric:         Attention and Perception: Attention and perception normal.         Mood and Affect: Mood and affect normal.         Speech: Speech normal.         Behavior: Behavior normal. Behavior is cooperative.         Thought Content: Thought content normal.         Cognition and Memory: Cognition and memory normal.         Judgment: Judgment normal.      Result Review :   The following data was reviewed by: SID Ramos on 7/24/2023:      Data reviewed: Cardiology studies holter monitor 6/7/21      ECG 12 Lead    Date/Time: 7/24/2023 2:27 PM  Performed by: Bev Lucio APRN  Authorized by: Bev Lucio APRN   Comparison: compared with previous ECG from 11/29/2022  Rhythm: sinus rhythm  Rate: normal  BPM: 67  QRS axis: normal    Clinical impression: normal ECG          Assessment and Plan    Diagnoses and all orders for this visit:    1.  Primary hypertension (Primary)-blood pressure is elevated on office today. However, pt reports he has started back on bariatric diet over the last month. Continue Benicar, HCTZ, verapamil and metoprolol tartrate for now.  Monitor and record daily blood pressure. Report readings consistently higher than 130/90 or  consistently lower than 100/60.  Stable.    2. Obstructive sleep apnea syndrome-patient reports compliance with CPAP.  Stable.    3. Class 2 severe obesity due to excess calories with serious comorbidity and body mass index (BMI) of 39.0 to 39.9 in adult ([facility])- Patient's Body mass index is 39.96 kg/m². indicating that he is obese (BMI >30). Obesity-related health conditions include the following: obstructive sleep apnea and hypertension. Obesity is unchanged. BMI is is above average; no BMI management plan is appropriate. We discussed low calorie, low carb based diet program, portion control and increasing exercise.    4. PSVT (paroxysmal supraventricular tachycardia) ([facility])-stable.  Continue verapamil and metoprolol tartrate.    5. PVC (premature ventricular contraction)- 0.02% burden on holter monitor 5/10/21. Stable. Continue metoprolol tartrate and verapamil.     Follow Up   Return in about 6 months (around 1/24/2024) for Next scheduled follow up.  Patient was given instructions and counseling regarding his condition or for health maintenance advice. Please see specific information pulled into the AVS if appropriate.

## 2023-10-02 ENCOUNTER — TELEPHONE (OUTPATIENT)
Dept: CARDIOLOGY | Facility: CLINIC | Age: 58
End: 2023-10-02
Payer: COMMERCIAL

## 2024-02-15 ENCOUNTER — OFFICE VISIT (OUTPATIENT)
Dept: CARDIOLOGY | Facility: CLINIC | Age: 59
End: 2024-02-15
Payer: COMMERCIAL

## 2024-02-15 VITALS
HEART RATE: 68 BPM | DIASTOLIC BLOOD PRESSURE: 89 MMHG | BODY MASS INDEX: 37.19 KG/M2 | SYSTOLIC BLOOD PRESSURE: 148 MMHG | WEIGHT: 315 LBS | HEIGHT: 77 IN

## 2024-02-15 DIAGNOSIS — R00.2 PALPITATIONS: ICD-10-CM

## 2024-02-15 DIAGNOSIS — I10 PRIMARY HYPERTENSION: Chronic | ICD-10-CM

## 2024-02-15 DIAGNOSIS — R06.09 DOE (DYSPNEA ON EXERTION): Primary | ICD-10-CM

## 2024-02-15 DIAGNOSIS — I47.10 PSVT (PAROXYSMAL SUPRAVENTRICULAR TACHYCARDIA): ICD-10-CM

## 2024-02-15 DIAGNOSIS — G47.33 OSA ON CPAP: ICD-10-CM

## 2024-02-15 DIAGNOSIS — I49.3 PVC (PREMATURE VENTRICULAR CONTRACTION): ICD-10-CM

## 2024-02-15 RX ORDER — FERROUS SULFATE 325(65) MG
1 TABLET ORAL EVERY 12 HOURS SCHEDULED
COMMUNITY
Start: 2024-01-26

## 2024-02-15 RX ORDER — HYDRALAZINE HYDROCHLORIDE 25 MG/1
25 TABLET, FILM COATED ORAL 2 TIMES DAILY
Qty: 180 TABLET | Refills: 3 | Status: SHIPPED | OUTPATIENT
Start: 2024-02-15

## 2024-02-15 RX ORDER — LINACLOTIDE 72 UG/1
1 CAPSULE, GELATIN COATED ORAL DAILY
COMMUNITY
Start: 2024-01-26

## 2024-07-23 ENCOUNTER — OFFICE VISIT (OUTPATIENT)
Dept: CARDIOLOGY | Facility: CLINIC | Age: 59
End: 2024-07-23
Payer: COMMERCIAL

## 2024-07-23 VITALS
HEART RATE: 63 BPM | BODY MASS INDEX: 37.19 KG/M2 | HEIGHT: 77 IN | WEIGHT: 315 LBS | SYSTOLIC BLOOD PRESSURE: 116 MMHG | DIASTOLIC BLOOD PRESSURE: 74 MMHG

## 2024-07-23 DIAGNOSIS — E78.2 MIXED HYPERLIPIDEMIA: ICD-10-CM

## 2024-07-23 DIAGNOSIS — I47.10 PSVT (PAROXYSMAL SUPRAVENTRICULAR TACHYCARDIA): ICD-10-CM

## 2024-07-23 DIAGNOSIS — I49.3 PVC (PREMATURE VENTRICULAR CONTRACTION): ICD-10-CM

## 2024-07-23 DIAGNOSIS — G47.33 OSA ON CPAP: ICD-10-CM

## 2024-07-23 DIAGNOSIS — E66.01 CLASS 2 SEVERE OBESITY DUE TO EXCESS CALORIES WITH SERIOUS COMORBIDITY AND BODY MASS INDEX (BMI) OF 39.0 TO 39.9 IN ADULT: ICD-10-CM

## 2024-07-23 DIAGNOSIS — I10 PRIMARY HYPERTENSION: Primary | Chronic | ICD-10-CM

## 2024-07-23 PROCEDURE — 99214 OFFICE O/P EST MOD 30 MIN: CPT | Performed by: NURSE PRACTITIONER

## 2024-07-23 RX ORDER — METOPROLOL TARTRATE 100 MG/1
100 TABLET ORAL 2 TIMES DAILY
Qty: 180 TABLET | Refills: 3 | Status: SHIPPED | OUTPATIENT
Start: 2024-07-23

## 2024-07-23 NOTE — PROGRESS NOTES
"Chief Complaint  Hypertension (3 month follow up )    Subjective          Vignesh Alvarez presents to [facility] CARDIOLOGY for routine follow-up of medication adjustment.  He was started on hydralazine 25 mg 3 times daily at his last office visit on 2/15/2024 for uncontrolled hypertension.  He has paroxysmal supraventricular tachycardia, premature ventricular contractions, hypertension, hyperlipidemia, obstructive sleep apnea and obesity. He continues to report occasional palpitations, however he reports decreased frequency since his last visit. Patient denies chest pain, shortness of breath, dizziness, syncope, orthopnea, PND, edema or decreased stamina.  Patient denies any signs of bleeding.    Hypertension  This is a chronic problem. The current episode started more than 1 year ago. The problem is controlled. Associated symptoms include palpitations. Pertinent negatives include no anxiety, blurred vision, chest pain, headaches, malaise/fatigue, neck pain, orthopnea, peripheral edema, PND, shortness of breath or sweats. Risk factors for coronary artery disease include male gender, obesity and dyslipidemia. Current antihypertension treatment includes beta blockers, diuretics, ACE inhibitors, calcium channel blockers and direct vasodilators. The current treatment provides significant improvement.   Hyperlipidemia  This is a chronic problem. The current episode started more than 1 year ago. Exacerbating diseases include obesity. Pertinent negatives include no chest pain or shortness of breath. Current antihyperlipidemic treatment includes statins. Risk factors for coronary artery disease include hypertension, male sex, obesity and dyslipidemia.       I have reviewed and confirmed the accuracy of the ROS SID Ramos      Objective   Vital Signs:   /74   Pulse 63   Ht 195.6 cm (77\")   Wt (!) 149 kg (329 lb)   BMI 39.01 kg/m²     Vitals and nursing note reviewed.   Constitutional: "       General: Awake.      Appearance: Normal and healthy appearance. Well-developed and not in distress. Obese.   Eyes:      General: Lids are normal.      Conjunctiva/sclera: Conjunctivae normal.      Pupils: Pupils are equal, round, and reactive to light.   HENT:      Head: Normocephalic and atraumatic.      Nose: Nose normal.   Neck:      Vascular: No JVR. JVD normal.   Pulmonary:      Effort: Pulmonary effort is normal.      Breath sounds: Normal breath sounds. No wheezing. No rhonchi. No rales.   Chest:      Chest wall: Not tender to palpatation.   Cardiovascular:      PMI at left midclavicular line. Normal rate. Regular rhythm. Normal S1. Normal S2.       Murmurs: There is no murmur.      No gallop.  No click. No rub.   Pulses:     Intact distal pulses.   Edema:     Peripheral edema absent.   Abdominal:      General: Bowel sounds are normal.      Palpations: Abdomen is soft.      Tenderness: There is no abdominal tenderness.   Musculoskeletal: Normal range of motion.         General: No tenderness.      Cervical back: Normal range of motion. Skin:     General: Skin is warm and dry.   Neurological:      General: No focal deficit present.      Mental Status: Alert, oriented to person, place, and time and oriented to person, place and time.   Psychiatric:         Attention and Perception: Attention and perception normal.         Mood and Affect: Mood and affect normal.         Speech: Speech normal.         Behavior: Behavior normal. Behavior is cooperative.         Thought Content: Thought content normal.         Cognition and Memory: Cognition and memory normal.         Judgment: Judgment normal.        Result Review :   The following data was reviewed by: SID Ramos on 7/23/2024:      Data reviewed: Cardiology studies holter monitor 6/7/21           Assessment and Plan    Diagnoses and all orders for this visit:    1.  Primary hypertension (Primary)-blood pressure is well-controlled. Continue  olmesartan/HCTZ, verapamil, hydralazine and metoprolol tartrate.  Monitor and record daily blood pressure. Report readings consistently higher than 130/80 or consistently lower than 100/60.  Stable.    2. Obstructive sleep apnea syndrome-patient reports compliance with CPAP.  Stable.    3. Class 2 severe obesity due to excess calories with serious comorbidity and body mass index (BMI) of 39.0 to 39.9 in adult ([facility])- Patient's Body mass index is 39.01 kg/m². indicating that he is obese (BMI >30). Obesity-related health conditions include the following: obstructive sleep apnea and hypertension. Obesity is unchanged. BMI is is above average; no BMI management plan is appropriate. We discussed low calorie, low carb based diet program, portion control and increasing exercise.    4. PSVT (paroxysmal supraventricular tachycardia) ([facility])-stable.  Continue verapamil and metoprolol tartrate.    5. PVC (premature ventricular contraction)- 0.02% burden on holter monitor 5/10/21. Stable. Continue metoprolol tartrate and verapamil.     6. Mixed hyperlipidemia- management per PCP. Continue atorvastatin.     Follow Up   Return in about 6 months (around 1/23/2025).  Patient was given instructions and counseling regarding his condition or for health maintenance advice. Please see specific information pulled into the AVS if appropriate.

## 2025-01-20 RX ORDER — HYDRALAZINE HYDROCHLORIDE 25 MG/1
25 TABLET, FILM COATED ORAL 2 TIMES DAILY
Qty: 180 TABLET | Refills: 3 | Status: SHIPPED | OUTPATIENT
Start: 2025-01-20

## 2025-01-20 NOTE — TELEPHONE ENCOUNTER
LOV - 07/23/2024     Primary hypertension (Primary)-blood pressure is well-controlled. Continue olmesartan/HCTZ, verapamil, hydralazine and metoprolol tartrate.  Monitor and record daily blood pressure. Report readings consistently higher than 130/80 or consistently lower than 100/60.  Stable.

## 2025-01-22 NOTE — PROGRESS NOTES
"Chief Complaint  PSVT (6mo F/U) and Hypertension    Subjective          Vignesh Alvarez presents to [facility] CARDIOLOGY for routine follow-up.  He has paroxysmal supraventricular tachycardia, premature ventricular contractions, hypertension, hyperlipidemia, obstructive sleep apnea and obesity. He continues to report occasional palpitations. Patient denies chest pain, shortness of breath, dizziness, syncope, orthopnea, PND, edema or decreased stamina.  Patient denies any signs of bleeding.    Hypertension  This is a chronic problem. The current episode started more than 1 year ago. The problem is controlled. Associated symptoms include palpitations. Pertinent negatives include no anxiety, blurred vision, chest pain, headaches, malaise/fatigue, neck pain, orthopnea, peripheral edema, PND, shortness of breath or sweats. Risk factors for coronary artery disease include male gender, obesity and dyslipidemia. Current antihypertension treatment includes beta blockers, diuretics, ACE inhibitors, calcium channel blockers and direct vasodilators. The current treatment provides significant improvement.   Hyperlipidemia  This is a chronic problem. The current episode started more than 1 year ago. Exacerbating diseases include obesity. Pertinent negatives include no chest pain or shortness of breath. Current antihyperlipidemic treatment includes statins. Risk factors for coronary artery disease include hypertension, male sex, obesity and dyslipidemia.     I have reviewed and confirmed the accuracy of the ROS SID Ramos      Objective   Vital Signs:   /83   Pulse 71   Ht 195.6 cm (77\")   Wt (!) 152 kg (334 lb)   SpO2 98%   BMI 39.61 kg/m²     Vitals and nursing note reviewed.   Constitutional:       General: Awake.      Appearance: Normal and healthy appearance. Well-developed and not in distress. Obese.   Eyes:      General: Lids are normal.      Conjunctiva/sclera: Conjunctivae normal. "      Pupils: Pupils are equal, round, and reactive to light.   HENT:      Head: Normocephalic and atraumatic.      Nose: Nose normal.   Neck:      Vascular: No JVR. JVD normal.   Pulmonary:      Effort: Pulmonary effort is normal.      Breath sounds: Normal breath sounds. No wheezing. No rhonchi. No rales.   Chest:      Chest wall: Not tender to palpatation.   Cardiovascular:      PMI at left midclavicular line. Normal rate. Regular rhythm. Normal S1. Normal S2.       Murmurs: There is no murmur.      No gallop.  No click. No rub.   Pulses:     Intact distal pulses.   Edema:     Peripheral edema absent.   Abdominal:      General: Bowel sounds are normal.      Palpations: Abdomen is soft.      Tenderness: There is no abdominal tenderness.   Musculoskeletal: Normal range of motion.         General: No tenderness.      Cervical back: Normal range of motion. Skin:     General: Skin is warm and dry.   Neurological:      General: No focal deficit present.      Mental Status: Alert, oriented to person, place, and time and oriented to person, place and time.   Psychiatric:         Attention and Perception: Attention and perception normal.         Mood and Affect: Mood and affect normal.         Speech: Speech normal.         Behavior: Behavior normal. Behavior is cooperative.         Thought Content: Thought content normal.         Cognition and Memory: Cognition and memory normal.         Judgment: Judgment normal.        Result Review :   The following data was reviewed by: SID Ramos on 01/23/2025:      Data reviewed: Cardiology studies holter monitor 6/7/21      ECG 12 Lead    Date/Time: 1/23/2025 1:22 PM  Performed by: Bev Lucio APRN    Authorized by: Bev Lucio APRN  Comparison: compared with previous ECG from 7/24/2023  Similar to previous ECG  Rhythm: sinus rhythm  Rate: normal  BPM: 71  QRS axis: normal    Clinical impression: normal ECG            Assessment and Plan    Diagnoses  and all orders for this visit:    1.  Primary hypertension (Primary)-blood pressure is elevated in office today, however pt reports better control at home with readings consistently lower than 130/80 at home. Continue olmesartan/HCTZ, verapamil, hydralazine and metoprolol tartrate.  Monitor and record daily blood pressure. Report readings consistently higher than 130/80 or consistently lower than 100/60.  Stable.    2. Obstructive sleep apnea syndrome-patient reports compliance with CPAP.  Stable.    3. Class 2 severe obesity due to excess calories with serious comorbidity and body mass index (BMI) of 39.0 to 39.9 in adult ([facility])- Patient's Body mass index is 39.61 kg/m². indicating that he is obese (BMI >30). Obesity-related health conditions include the following: obstructive sleep apnea and hypertension. Obesity is unchanged. BMI is is above average; no BMI management plan is appropriate. We discussed low calorie, low carb based diet program, portion control and increasing exercise.    4. PSVT (paroxysmal supraventricular tachycardia) ([facility])-stable.  Continue verapamil and metoprolol tartrate.    5. PVC (premature ventricular contraction)- 0.02% burden on holter monitor 5/10/21. Stable. Continue metoprolol tartrate and verapamil.     6. Mixed hyperlipidemia- management per PCP. Continue atorvastatin.     Follow Up   Return in about 1 year (around 1/23/2026) for Next scheduled follow up.  Patient was given instructions and counseling regarding his condition or for health maintenance advice. Please see specific information pulled into the AVS if appropriate.

## 2025-01-23 ENCOUNTER — OFFICE VISIT (OUTPATIENT)
Dept: CARDIOLOGY | Facility: CLINIC | Age: 60
End: 2025-01-23
Payer: COMMERCIAL

## 2025-01-23 VITALS
SYSTOLIC BLOOD PRESSURE: 144 MMHG | HEART RATE: 71 BPM | BODY MASS INDEX: 37.19 KG/M2 | HEIGHT: 77 IN | WEIGHT: 315 LBS | DIASTOLIC BLOOD PRESSURE: 83 MMHG | OXYGEN SATURATION: 98 %

## 2025-01-23 DIAGNOSIS — E78.2 MIXED HYPERLIPIDEMIA: ICD-10-CM

## 2025-01-23 DIAGNOSIS — G47.33 OSA ON CPAP: ICD-10-CM

## 2025-01-23 DIAGNOSIS — I10 PRIMARY HYPERTENSION: Primary | Chronic | ICD-10-CM

## 2025-01-23 DIAGNOSIS — I49.3 PVC (PREMATURE VENTRICULAR CONTRACTION): ICD-10-CM

## 2025-01-23 DIAGNOSIS — E66.01 CLASS 2 SEVERE OBESITY DUE TO EXCESS CALORIES WITH SERIOUS COMORBIDITY AND BODY MASS INDEX (BMI) OF 39.0 TO 39.9 IN ADULT: ICD-10-CM

## 2025-01-23 DIAGNOSIS — E66.812 CLASS 2 SEVERE OBESITY DUE TO EXCESS CALORIES WITH SERIOUS COMORBIDITY AND BODY MASS INDEX (BMI) OF 39.0 TO 39.9 IN ADULT: ICD-10-CM

## 2025-01-23 DIAGNOSIS — I47.10 PSVT (PAROXYSMAL SUPRAVENTRICULAR TACHYCARDIA): ICD-10-CM

## 2025-01-23 PROCEDURE — 99214 OFFICE O/P EST MOD 30 MIN: CPT | Performed by: NURSE PRACTITIONER

## 2025-01-23 PROCEDURE — 93000 ELECTROCARDIOGRAM COMPLETE: CPT | Performed by: NURSE PRACTITIONER

## 2025-01-23 RX ORDER — TIRZEPATIDE 2.5 MG/.5ML
INJECTION, SOLUTION SUBCUTANEOUS
COMMUNITY
Start: 2025-01-20

## 2025-01-23 RX ORDER — HYDROCHLOROTHIAZIDE 25 MG/1
TABLET ORAL
COMMUNITY